# Patient Record
Sex: FEMALE | Race: OTHER | Employment: FULL TIME | URBAN - NONMETROPOLITAN AREA
[De-identification: names, ages, dates, MRNs, and addresses within clinical notes are randomized per-mention and may not be internally consistent; named-entity substitution may affect disease eponyms.]

---

## 2017-04-17 ENCOUNTER — OFFICE VISIT (OUTPATIENT)
Age: 32
End: 2017-04-17

## 2017-04-17 VITALS
OXYGEN SATURATION: 98 % | TEMPERATURE: 98.9 F | DIASTOLIC BLOOD PRESSURE: 74 MMHG | SYSTOLIC BLOOD PRESSURE: 120 MMHG | RESPIRATION RATE: 16 BRPM | WEIGHT: 203.3 LBS | HEART RATE: 78 BPM | BODY MASS INDEX: 33.87 KG/M2 | HEIGHT: 65 IN

## 2017-04-17 DIAGNOSIS — K80.20 CALCULUS OF GALLBLADDER WITHOUT CHOLECYSTITIS WITHOUT OBSTRUCTION: Primary | ICD-10-CM

## 2017-04-17 DIAGNOSIS — K42.9 UMBILICAL HERNIA WITHOUT OBSTRUCTION AND WITHOUT GANGRENE: ICD-10-CM

## 2017-04-17 PROCEDURE — 99203 OFFICE O/P NEW LOW 30 MIN: CPT | Performed by: SURGERY

## 2017-04-17 ASSESSMENT — ENCOUNTER SYMPTOMS
CONSTIPATION: 0
COUGH: 0
BACK PAIN: 0
NAUSEA: 1
SORE THROAT: 0
BLOOD IN STOOL: 0
SHORTNESS OF BREATH: 1
STRIDOR: 0
ABDOMINAL PAIN: 1
VOMITING: 1

## 2017-05-04 ENCOUNTER — TELEPHONE (OUTPATIENT)
Age: 32
End: 2017-05-04

## 2017-05-08 ENCOUNTER — TELEPHONE (OUTPATIENT)
Age: 32
End: 2017-05-08

## 2017-05-08 DIAGNOSIS — R11.2 NAUSEA AND VOMITING, INTRACTABILITY OF VOMITING NOT SPECIFIED, UNSPECIFIED VOMITING TYPE: Primary | ICD-10-CM

## 2017-05-08 RX ORDER — ONDANSETRON 4 MG/1
4 TABLET, FILM COATED ORAL DAILY PRN
Qty: 20 TABLET | Refills: 0 | Status: SHIPPED | OUTPATIENT
Start: 2017-05-08 | End: 2017-05-18

## 2017-05-18 ENCOUNTER — OFFICE VISIT (OUTPATIENT)
Age: 32
End: 2017-05-18

## 2017-05-18 VITALS
BODY MASS INDEX: 33.61 KG/M2 | RESPIRATION RATE: 18 BRPM | DIASTOLIC BLOOD PRESSURE: 72 MMHG | WEIGHT: 201.7 LBS | HEART RATE: 73 BPM | SYSTOLIC BLOOD PRESSURE: 124 MMHG | HEIGHT: 65 IN | TEMPERATURE: 99.2 F | OXYGEN SATURATION: 97 %

## 2017-05-18 DIAGNOSIS — Z98.890 POST-OPERATIVE STATE: ICD-10-CM

## 2017-05-18 DIAGNOSIS — K80.20 CALCULUS OF GALLBLADDER WITHOUT CHOLECYSTITIS WITHOUT OBSTRUCTION: Primary | ICD-10-CM

## 2017-05-18 PROCEDURE — 99024 POSTOP FOLLOW-UP VISIT: CPT | Performed by: SURGERY

## 2018-01-10 ENCOUNTER — HOSPITAL ENCOUNTER (EMERGENCY)
Age: 33
Discharge: HOME | End: 2018-01-10

## 2018-01-10 VITALS
DIASTOLIC BLOOD PRESSURE: 88 MMHG | BODY MASS INDEX: 34.38 KG/M2 | HEART RATE: 75 BPM | OXYGEN SATURATION: 98 % | WEIGHT: 206.6 LBS | RESPIRATION RATE: 20 BRPM | TEMPERATURE: 98.2 F | SYSTOLIC BLOOD PRESSURE: 144 MMHG

## 2018-01-10 DIAGNOSIS — J45.21 MILD INTERMITTENT ACUTE ASTHMATIC BRONCHITIS: Primary | ICD-10-CM

## 2018-01-10 PROCEDURE — 99214 OFFICE O/P EST MOD 30 MIN: CPT | Performed by: NURSE PRACTITIONER

## 2018-01-10 PROCEDURE — 99212 OFFICE O/P EST SF 10 MIN: CPT

## 2018-01-10 RX ORDER — BENZONATATE 200 MG/1
200 CAPSULE ORAL 3 TIMES DAILY PRN
Qty: 21 CAPSULE | Refills: 0 | Status: SHIPPED | OUTPATIENT
Start: 2018-01-10 | End: 2018-01-17

## 2018-01-10 RX ORDER — PREDNISONE 20 MG/1
20 TABLET ORAL 2 TIMES DAILY
Qty: 10 TABLET | Refills: 0 | Status: SHIPPED | OUTPATIENT
Start: 2018-01-10 | End: 2018-01-15

## 2018-01-10 RX ORDER — AZITHROMYCIN 250 MG/1
TABLET, FILM COATED ORAL
Qty: 6 TABLET | Refills: 0 | Status: SHIPPED | OUTPATIENT
Start: 2018-01-10 | End: 2018-02-28

## 2018-01-10 NOTE — ED PROVIDER NOTES
[id number]  Urgent Care Encounter      CHIEF COMPLAINT       Chief Complaint   Patient presents with    Cough     x 1 week     Sweats     x 1 week     Pharyngitis    Sinusitis       Nurses Notes reviewed and I agree except as noted in the HPI. HISTORY OF PRESENT ILLNESS   Donald Albert is a 28 y.o. female who presents:  Sore throat, nonproductive cough, post nasal drip, post tussive vomiting started yesterday, wheezing over last couple days. Has used over counter Dayquil, mucinex. History of asthma, diabetes. She currently works at the hospital.       The history is provided by the patient. REVIEW OF SYSTEMS     Review of Systems   Constitutional: Positive for chills and diaphoresis. Negative for activity change, appetite change, fatigue, fever and unexpected weight change. HENT: Positive for congestion, postnasal drip, rhinorrhea and sore throat. Negative for ear discharge, ear pain, mouth sores, sinus pressure, sneezing and voice change. Eyes: Negative for photophobia, discharge, redness, itching and visual disturbance. Respiratory: Negative for cough, chest tightness, shortness of breath and wheezing. Gastrointestinal: Positive for vomiting. Negative for abdominal pain, diarrhea and nausea. Musculoskeletal: Negative for myalgias. Skin: Negative for pallor and rash. Neurological: Negative for dizziness, light-headedness and headaches. Hematological: Negative for adenopathy. PAST MEDICAL HISTORY         Diagnosis Date    Asthma     Diabetes mellitus ([address]     Gestational    PONV (postoperative nausea and vomiting)     Prolonged emergence from general anesthesia     really sleepy    PVC's (premature ventricular contractions)     Tachycardia        SURGICAL HISTORY     Patient  has a past surgical history that includes Appendectomy; Tonsillectomy and adenoidectomy (d an); Dilation and curettage of uterus; and Cholecystectomy (05/03/201).     CURRENT MEDICATIONS       Discharge Medication List as of 1/10/2018  5:10 PM      CONTINUE these medications which have NOT CHANGED    Details   Homeopathic Products (OSCILLOCOCCINUM PO) Take by mouthHistorical Med      albuterol sulfate  (90 BASE) MCG/ACT inhaler Inhale 2 puffs into the lungs every 4 hours as needed for Wheezing or Shortness of Breath, Disp-1 Inhaler, R-0      acetaminophen (TYLENOL) 500 MG tablet Take 1,000 mg by mouth every 6 hours as needed for Pain             ALLERGIES     Patient is is allergic to reglan [metoclopramide hcl] and tetanus toxoids. FAMILY HISTORY     Patient's family history includes Asthma in her mother; Diabetes in her maternal grandmother and mother; Heart Disease in her maternal grandmother; High Blood Pressure in her mother. SOCIAL HISTORY     Patient  reports that she has never smoked. She has never used smokeless tobacco. She reports that she drinks alcohol. She reports that she does not use drugs. PHYSICAL EXAM     ED TRIAGE VITALS  BP: (!) 144/88, Temp: 98.2 °F (36.8 °C), Pulse: 75, Resp: 20, SpO2: 98 %  Physical Exam   Constitutional: She is oriented to person, place, and time. She appears well-developed and well-nourished. Non-toxic appearance. She does not have a sickly appearance. She does not appear ill. No distress. HENT:   Head: Normocephalic and atraumatic. Head is without right periorbital erythema and without left periorbital erythema. Right Ear: Hearing, tympanic membrane, external ear and ear canal normal.   Left Ear: Hearing, tympanic membrane, external ear and ear canal normal.   Nose: Nose normal. Right sinus exhibits no maxillary sinus tenderness and no frontal sinus tenderness. Left sinus exhibits no maxillary sinus tenderness and no frontal sinus tenderness. Mouth/Throat: Uvula is midline, oropharynx is clear and moist and mucous membranes are normal.   Neck: Normal range of motion. Neck supple.    Cardiovascular: Normal rate, regular

## 2018-01-11 ASSESSMENT — ENCOUNTER SYMPTOMS
PHOTOPHOBIA: 0
VOMITING: 1
DIARRHEA: 0
WHEEZING: 0
ABDOMINAL PAIN: 0
COUGH: 0
CHEST TIGHTNESS: 0
SHORTNESS OF BREATH: 0
SINUS PRESSURE: 0
EYE ITCHING: 0
VOICE CHANGE: 0
EYE REDNESS: 0
SORE THROAT: 1
RHINORRHEA: 1
NAUSEA: 0
EYE DISCHARGE: 0

## 2018-02-28 ENCOUNTER — HOSPITAL ENCOUNTER (EMERGENCY)
Dept: GENERAL RADIOLOGY | Age: 33
Discharge: HOME | End: 2018-02-28

## 2018-02-28 ENCOUNTER — HOSPITAL ENCOUNTER (EMERGENCY)
Age: 33
Discharge: HOME | End: 2018-02-28

## 2018-02-28 VITALS
SYSTOLIC BLOOD PRESSURE: 128 MMHG | BODY MASS INDEX: 34.66 KG/M2 | TEMPERATURE: 97.7 F | RESPIRATION RATE: 16 BRPM | HEART RATE: 79 BPM | DIASTOLIC BLOOD PRESSURE: 87 MMHG | HEIGHT: 65 IN | WEIGHT: 208 LBS | OXYGEN SATURATION: 98 %

## 2018-02-28 DIAGNOSIS — R07.89 CHEST WALL PAIN: ICD-10-CM

## 2018-02-28 DIAGNOSIS — R51.9 INTRACTABLE EPISODIC HEADACHE, UNSPECIFIED HEADACHE TYPE: ICD-10-CM

## 2018-02-28 DIAGNOSIS — H81.10 BENIGN PAROXYSMAL POSITIONAL VERTIGO, UNSPECIFIED LATERALITY: Primary | ICD-10-CM

## 2018-02-28 DIAGNOSIS — H65.93 MIDDLE EAR EFFUSION, BILATERAL: ICD-10-CM

## 2018-02-28 LAB
EKG ATRIAL RATE: 74 BPM
EKG P AXIS: 13 DEGREES
EKG P-R INTERVAL: 198 MS
EKG Q-T INTERVAL: 378 MS
EKG QRS DURATION: 80 MS
EKG QTC CALCULATION (BAZETT): 419 MS
EKG R AXIS: 73 DEGREES
EKG T AXIS: 76 DEGREES
EKG VENTRICULAR RATE: 74 BPM

## 2018-02-28 PROCEDURE — 71046 X-RAY EXAM CHEST 2 VIEWS: CPT

## 2018-02-28 PROCEDURE — 93010 ELECTROCARDIOGRAM REPORT: CPT | Performed by: NUCLEAR MEDICINE

## 2018-02-28 PROCEDURE — 99214 OFFICE O/P EST MOD 30 MIN: CPT | Performed by: NURSE PRACTITIONER

## 2018-02-28 PROCEDURE — 93005 ELECTROCARDIOGRAM TRACING: CPT | Performed by: NURSE PRACTITIONER

## 2018-02-28 PROCEDURE — 99215 OFFICE O/P EST HI 40 MIN: CPT

## 2018-02-28 RX ORDER — PREDNISONE 20 MG/1
20 TABLET ORAL 2 TIMES DAILY
Qty: 10 TABLET | Refills: 0 | Status: SHIPPED | OUTPATIENT
Start: 2018-02-28 | End: 2018-03-05

## 2018-02-28 ASSESSMENT — HEART SCORE
ECG: 0
ECG: 0

## 2018-02-28 ASSESSMENT — ENCOUNTER SYMPTOMS
BACK PAIN: 1
CHEST TIGHTNESS: 0
SHORTNESS OF BREATH: 0
RHINORRHEA: 0
TROUBLE SWALLOWING: 0
SINUS PAIN: 0
ORTHOPNEA: 0
DIARRHEA: 0
SINUS PRESSURE: 0
SORE THROAT: 0
ABDOMINAL PAIN: 0
PHOTOPHOBIA: 0
VOMITING: 0
NAUSEA: 0
COUGH: 0

## 2018-02-28 ASSESSMENT — PAIN SCALES - GENERAL: PAINLEVEL_OUTOF10: 5

## 2018-02-28 ASSESSMENT — PAIN DESCRIPTION - DESCRIPTORS: DESCRIPTORS: PRESSURE

## 2018-02-28 ASSESSMENT — PAIN DESCRIPTION - LOCATION: LOCATION: HEAD;CHEST

## 2018-02-28 NOTE — ED PROVIDER NOTES
Neurological: She is alert and oriented to person, place, and time. Skin: Skin is warm, dry and intact. No rash noted. She is not diaphoretic. Nursing note and vitals reviewed. DIAGNOSTIC RESULTS   Labs:No results found for this visit on 02/28/18. IMAGING:    XR CHEST STANDARD (2 VW)   Final Result   Normal chest radiographs. **This report has been created using voice recognition software. It may contain minor errors which are inherent in voice recognition technology. **      Final report electronically signed by Dr. Jannet Momin on 2/28/2018 9:14 AM            EKG:      URGENT CARE COURSE:     Vitals:    02/28/18 0817 02/28/18 0857   BP: 128/87    Pulse: 79    Resp: 16    Temp: 97.7 °F (36.5 °C)    TempSrc: Temporal    SpO2: 97% 98%   Weight: 208 lb (94.3 kg)    Height: 5' 5\" (1.651 m)        Medications - No data to display    ED Course        PROCEDURES:  None    FINAL IMPRESSION      1. Benign paroxysmal positional vertigo, unspecified laterality    2. Chest wall pain    3. Middle ear effusion, bilateral    4. Intractable episodic headache, unspecified headache type          DISPOSITION/PLAN   DISPOSITION Decision To Discharge 02/28/2018 09:23:11 AM  The patient was advised to go slow from lying sitting and standing the patient was also advised to apply heat to the chest she will also be given some prednisone to help relieve some of the medical where effusion as well as to the anterior chest discomfort. The patient was advised to go to emergency department if she has any changes such as shortness of breath, diaphoresis, nausea radiation of pain to the neck jaw or arm. She is to follow-up with her primary care provider for further evaluation. The patient is agreeable to the treatment plan states that she is going to work from here and left ambulatory without any problems. PATIENT REFERRED TO:  No follow-up provider specified.     DISCHARGE MEDICATIONS:  New Prescriptions

## 2018-07-22 ENCOUNTER — HOSPITAL ENCOUNTER (EMERGENCY)
Age: 33
Discharge: HOME | End: 2018-07-22
Payer: COMMERCIAL

## 2018-07-22 ENCOUNTER — APPOINTMENT (OUTPATIENT)
Dept: GENERAL RADIOLOGY | Age: 33
End: 2018-07-22
Payer: COMMERCIAL

## 2018-07-22 VITALS
BODY MASS INDEX: 33.49 KG/M2 | DIASTOLIC BLOOD PRESSURE: 99 MMHG | TEMPERATURE: 98.9 F | WEIGHT: 201 LBS | SYSTOLIC BLOOD PRESSURE: 152 MMHG | OXYGEN SATURATION: 98 % | HEIGHT: 65 IN | RESPIRATION RATE: 14 BRPM | HEART RATE: 83 BPM

## 2018-07-22 DIAGNOSIS — S46.912A STRAIN OF LEFT SHOULDER, INITIAL ENCOUNTER: Primary | ICD-10-CM

## 2018-07-22 PROCEDURE — 99283 EMERGENCY DEPT VISIT LOW MDM: CPT

## 2018-07-22 PROCEDURE — 73030 X-RAY EXAM OF SHOULDER: CPT

## 2018-07-22 PROCEDURE — A4565 SLINGS: HCPCS

## 2018-07-22 PROCEDURE — 73050 X-RAY EXAM OF SHOULDERS: CPT

## 2018-07-22 ASSESSMENT — ENCOUNTER SYMPTOMS
VOMITING: 0
NAUSEA: 0
COUGH: 0
COLOR CHANGE: 0
WHEEZING: 0
SHORTNESS OF BREATH: 0

## 2018-07-22 ASSESSMENT — PAIN SCALES - GENERAL: PAINLEVEL_OUTOF10: 8

## 2018-07-22 ASSESSMENT — PAIN DESCRIPTION - DESCRIPTORS: DESCRIPTORS: ACHING

## 2018-07-22 ASSESSMENT — PAIN DESCRIPTION - LOCATION: LOCATION: SHOULDER

## 2018-07-22 ASSESSMENT — PAIN DESCRIPTION - ORIENTATION: ORIENTATION: LEFT

## 2018-07-22 ASSESSMENT — PAIN DESCRIPTION - PAIN TYPE: TYPE: ACUTE PAIN

## 2018-07-22 NOTE — ED TRIAGE NOTES
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 Pt states it worsens with movement.

## 2018-07-22 NOTE — ED PROVIDER NOTES
emergence from general anesthesia; PVC's (premature ventricular contractions); and Tachycardia. SURGICAL HISTORY      has a past surgical history that includes Appendectomy; Tonsillectomy and adenoidectomy (d an); Dilation and curettage of uterus; and Cholecystectomy (05/03/201). CURRENT MEDICATIONS       Previous Medications    ACETAMINOPHEN (TYLENOL) 500 MG TABLET    Take 1,000 mg by mouth every 6 hours as needed for Pain    ALBUTEROL SULFATE  (90 BASE) MCG/ACT INHALER    Inhale 2 puffs into the lungs every 4 hours as needed for Wheezing or Shortness of Breath    MECLIZINE HCL PO    Take by mouth       ALLERGIES     is allergic to reglan [metoclopramide hcl] and tetanus toxoids. FAMILY HISTORY     indicated that her mother is alive. She indicated that her father is alive. She indicated that the status of her maternal grandmother is unknown.    family history includes Asthma in her mother; Diabetes in her maternal grandmother and mother; Heart Disease in her maternal grandmother; High Blood Pressure in her mother. SOCIAL HISTORY      reports that she has never smoked. She has never used smokeless tobacco. She reports that she drinks alcohol. She reports that she does not use drugs. PHYSICAL EXAM     INITIAL VITALS:  height is 5' 5\" (1.651 m) and weight is 201 lb (91.2 kg). Her oral temperature is 98.9 °F (37.2 °C). Her blood pressure is 152/99 (abnormal) and her pulse is 83. Her respiration is 14 and oxygen saturation is 98%. Physical Exam   Constitutional: She is oriented to person, place, and time. She appears well-developed and well-nourished. No distress. HENT:   Head: Normocephalic and atraumatic. Right Ear: External ear normal.   Left Ear: External ear normal.   Eyes: Conjunctivae and EOM are normal. Pupils are equal, round, and reactive to light. Neck: Normal range of motion. Neck supple.    Cardiovascular: Normal rate, regular rhythm, S1 normal, S2 normal, normal heart sounds

## 2019-06-12 ENCOUNTER — HOSPITAL ENCOUNTER (EMERGENCY)
Age: 34
Discharge: HOME | End: 2019-06-12
Payer: MEDICARE

## 2019-06-12 VITALS
WEIGHT: 192.6 LBS | BODY MASS INDEX: 32.09 KG/M2 | TEMPERATURE: 98.4 F | RESPIRATION RATE: 16 BRPM | DIASTOLIC BLOOD PRESSURE: 78 MMHG | HEART RATE: 78 BPM | OXYGEN SATURATION: 97 % | HEIGHT: 65 IN | SYSTOLIC BLOOD PRESSURE: 111 MMHG
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
## 2019-06-12 DIAGNOSIS — M79.632 LEFT FOREARM PAIN: Primary | ICD-10-CM

## 2019-06-12 PROCEDURE — 2709999900 HC NON-CHARGEABLE SUPPLY

## 2019-06-12 PROCEDURE — 99213 OFFICE O/P EST LOW 20 MIN: CPT

## 2019-06-12 PROCEDURE — L3809 WHFO W/O JOINTS PRE OTS: HCPCS

## 2019-06-12 PROCEDURE — 6360000002 HC RX W HCPCS: Performed by: NURSE PRACTITIONER

## 2019-06-12 PROCEDURE — 99213 OFFICE O/P EST LOW 20 MIN: CPT | Performed by: NURSE PRACTITIONER

## 2019-06-12 PROCEDURE — 96372 THER/PROPH/DIAG INJ SC/IM: CPT

## 2019-06-12 RX ORDER — PREDNISONE 20 MG/1
20 TABLET ORAL 2 TIMES DAILY
Qty: 14 TABLET | Refills: 0 | Status: SHIPPED | OUTPATIENT
Start: 2019-06-12 | End: 2019-06-19

## 2019-06-12 RX ORDER — KETOROLAC TROMETHAMINE 30 MG/ML
30 INJECTION, SOLUTION INTRAMUSCULAR; INTRAVENOUS ONCE
Status: COMPLETED | OUTPATIENT
Start: 2019-06-12 | End: 2019-06-12

## 2019-06-12 RX ORDER — METHYLPREDNISOLONE ACETATE 40 MG/ML
40 INJECTION, SUSPENSION INTRA-ARTICULAR; INTRALESIONAL; INTRAMUSCULAR; SOFT TISSUE ONCE
Status: COMPLETED | OUTPATIENT
Start: 2019-06-12 | End: 2019-06-12

## 2019-06-12 RX ORDER — METHYLPREDNISOLONE ACETATE 80 MG/ML
80 INJECTION, SUSPENSION INTRA-ARTICULAR; INTRALESIONAL; INTRAMUSCULAR; SOFT TISSUE ONCE
Status: COMPLETED | OUTPATIENT
Start: 2019-06-12 | End: 2019-06-12

## 2019-06-12 RX ADMIN — KETOROLAC TROMETHAMINE 30 MG: 30 INJECTION, SOLUTION INTRAMUSCULAR at 19:06

## 2019-06-12 RX ADMIN — METHYLPREDNISOLONE ACETATE 80 MG: 80 INJECTION, SUSPENSION INTRA-ARTICULAR; INTRALESIONAL; INTRAMUSCULAR; SOFT TISSUE at 19:06

## 2019-06-12 RX ADMIN — METHYLPREDNISOLONE ACETATE 40 MG: 40 INJECTION, SUSPENSION INTRA-ARTICULAR; INTRALESIONAL; INTRAMUSCULAR; SOFT TISSUE at 19:06

## 2019-06-12 ASSESSMENT — PAIN DESCRIPTION - DESCRIPTORS: DESCRIPTORS: ACHING

## 2019-06-12 ASSESSMENT — PAIN DESCRIPTION - ORIENTATION: ORIENTATION: LEFT;LOWER

## 2019-06-12 ASSESSMENT — PAIN SCALES - GENERAL
PAINLEVEL_OUTOF10: 7
PAINLEVEL_OUTOF10: 8
PAINLEVEL_OUTOF10: 10

## 2019-06-12 ASSESSMENT — PAIN - FUNCTIONAL ASSESSMENT: PAIN_FUNCTIONAL_ASSESSMENT: PREVENTS OR INTERFERES SOME ACTIVE ACTIVITIES AND ADLS

## 2019-06-12 ASSESSMENT — PAIN DESCRIPTION - PAIN TYPE: TYPE: ACUTE PAIN

## 2019-06-12 ASSESSMENT — PAIN DESCRIPTION - LOCATION: LOCATION: ARM

## 2019-06-12 NOTE — ED PROVIDER NOTES
[facility]  Urgent Care Encounter      [address] [facility]       Chief Complaint   Patient presents with    Arm Pain     left       Nurses Notes reviewed and I agree except as noted in the HPI. HISTORY OF PRESENT ILLNESS   Thom Christine is a 35 y.o. 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 The history is provided by the patient. Arm Injury   Location:  Arm  Arm location:  L forearm  Injury: no    Pain details:     Quality:  Sharp    Radiates to:  Does not radiate    Severity:  Severe (10/10)    Onset quality:  Sudden    Duration: 30 minutes ago she was at home leaning down to pet her animal at home and it felt like it popped and now has a sharp pain 10/10. Timing:  Constant    Progression:  Unchanged  Handedness:  Right-handed  Dislocation: no    Prior injury to area:  No  Relieved by:  None tried  Worsened by: Movement  Ineffective treatments:  None tried  Associated symptoms: no back pain, no decreased range of motion, no fatigue, no fever, no muscle weakness, no numbness, no swelling and no tingling        REVIEW OF SYSTEMS     Review of Systems   Constitutional: Positive for activity change. Negative for appetite change, chills, diaphoresis, fatigue and fever. Respiratory: Negative for shortness of breath. Cardiovascular: Negative for chest pain. Musculoskeletal: Positive for arthralgias (left forearm pain). Negative for back pain and joint swelling. Skin: Negative for color change, pallor, rash and wound. Neurological: Negative for weakness and numbness. Hematological: Does not bruise/bleed easily. Psychiatric/Behavioral: The patient is not nervous/anxious.         PAST MEDICAL HISTORY         Diagnosis Date    Asthma     Diabetes mellitus ([facility] [address]     Gestational    PONV (postoperative nausea and vomiting)     Prolonged emergence from general anesthesia     really sleepy    PVC's (premature ventricular contractions)     Tachycardia        SURGICAL HISTORY     Patient  has a past surgical history that includes Appendectomy; Tonsillectomy and adenoidectomy (d an); Dilation and curettage of uterus; and Cholecystectomy (05/03/201). CURRENT MEDICATIONS       Discharge Medication List as of 6/12/2019  7:30 PM      CONTINUE these medications which have NOT CHANGED    Details   MECLIZINE HCL PO Take by mouthHistorical Med      albuterol sulfate  (90 BASE) MCG/ACT inhaler Inhale 2 puffs into the lungs every 4 hours as needed for Wheezing or Shortness of Breath, Disp-1 Inhaler, R-0      acetaminophen (TYLENOL) 500 MG tablet Take 1,000 mg by mouth every 6 hours as needed for Pain             ALLERGIES     Patient is is allergic to reglan [metoclopramide hcl] and tetanus toxoids. FAMILY HISTORY     Patient's family history includes Asthma in her mother; Diabetes in her maternal grandmother and mother; Heart Disease in her maternal grandmother; High Blood Pressure in her mother. SOCIAL HISTORY     Patient  reports that she has never smoked. She has never used smokeless tobacco. She reports that she drinks alcohol. She reports that she does not use drugs. PHYSICAL EXAM     ED TRIAGE VITALS  BP: 111/78, Temp: 98.4 °F (36.9 °C), Pulse: 78, Resp: 16, SpO2: 97 %  Physical Exam   Constitutional: She is oriented to person, place, and time. Vital signs are normal. She appears well-developed and well-nourished. Non-toxic appearance. She does not have a sickly appearance. She does not appear ill. No distress. HENT:   Head: Normocephalic and atraumatic. Eyes: Right conjunctiva is not injected. Left conjunctiva is not injected. Pupils are equal.   Neck: Normal range of motion. Cardiovascular: Normal rate, regular rhythm, normal heart sounds and intact distal pulses.    No murmur 1. Left forearm pain    2. Overuse syndrome        DISPOSITION/PLAN   DISPOSITION Decision To Discharge 06/12/2019 07:16:54 PM  No injury   Has repetitive , overs use at work of that arm/hand  Onset 30 minutes ago, likely flared up when leaning down and petting her dog at home  velcro wrist splint applied  Toradol and Depo Medrol injection improved her pain during urgent care down from a 10 to a 7. No signs of infection or trauma  No neuro compromise noted  Moist heat ,alternate with ice  Take medication as directed      PATIENT REFERRED TO:  Aftab Officer, BLANQUITA Badillo CNP  [facility]    Schedule an appointment as soon as possible for a visit in 1 week      Jennifer Green   [address] [facility] [phone]  Schedule an appointment as soon as possible for a visit   If no improvement of symptoms    Patient instructed to follow up with PCP. If symptoms worsen, become severe or new symptoms develop patient instructedto go to the emergency room immediately. DISCHARGE MEDICATIONS:  Discharge Medication List as of 6/12/2019  7:30 PM      START taking these medications    Details   predniSONE (DELTASONE) 20 MG tablet Take 1 tablet by mouth 2 times daily for 7 days, Disp-14 tablet, R-0Normal           Discharge Medication List as of 6/12/2019  7:30 PM          Patient giveneducational materials - see patient instructions. Discussed use, benefit, and side effects of prescribed medications. All patient questions answered. Pt voiced understanding. Reviewed health maintenance. Patient agreedwith treatment plan. Follow up as directed.      Rosabel Schilder, APRN - CNP Rosabel Schilder, APRN - CNP  06/17/19 1007

## 2019-06-12 NOTE — ED TRIAGE NOTES
Patient ambulated to room with complaint of left forearm pain. States she was petting her dog and felt a sharp pain thru her forearm about 30 min ago.

## 2019-06-17 ASSESSMENT — ENCOUNTER SYMPTOMS
COLOR CHANGE: 0
BACK PAIN: 0
SHORTNESS OF BREATH: 0

## 2019-07-25 ENCOUNTER — HOSPITAL ENCOUNTER (EMERGENCY)
Age: 34
Discharge: HOME | End: 2019-07-25
Payer: MEDICARE

## 2019-07-25 VITALS
DIASTOLIC BLOOD PRESSURE: 79 MMHG | TEMPERATURE: 98.3 F | BODY MASS INDEX: 31.99 KG/M2 | HEIGHT: 65 IN | RESPIRATION RATE: 18 BRPM | OXYGEN SATURATION: 98 % | WEIGHT: 192 LBS | SYSTOLIC BLOOD PRESSURE: 116 MMHG | HEART RATE: 72 BPM

## 2019-07-25 DIAGNOSIS — G44.209 TENSION HEADACHE: Primary | ICD-10-CM

## 2019-07-25 PROCEDURE — 6370000000 HC RX 637 (ALT 250 FOR IP): Performed by: PHYSICIAN ASSISTANT

## 2019-07-25 PROCEDURE — 96374 THER/PROPH/DIAG INJ IV PUSH: CPT

## 2019-07-25 PROCEDURE — 96372 THER/PROPH/DIAG INJ SC/IM: CPT

## 2019-07-25 PROCEDURE — 6360000002 HC RX W HCPCS: Performed by: PHYSICIAN ASSISTANT

## 2019-07-25 PROCEDURE — 99283 EMERGENCY DEPT VISIT LOW MDM: CPT

## 2019-07-25 RX ORDER — ONDANSETRON 4 MG/1
4 TABLET, ORALLY DISINTEGRATING ORAL ONCE
Status: COMPLETED | OUTPATIENT
Start: 2019-07-25 | End: 2019-07-25

## 2019-07-25 RX ORDER — NAPROXEN 500 MG/1
500 TABLET ORAL 2 TIMES DAILY
Qty: 20 TABLET | Refills: 0 | Status: SHIPPED | OUTPATIENT
Start: 2019-07-25

## 2019-07-25 RX ORDER — KETOROLAC TROMETHAMINE 30 MG/ML
30 INJECTION, SOLUTION INTRAMUSCULAR; INTRAVENOUS ONCE
Status: COMPLETED | OUTPATIENT
Start: 2019-07-25 | End: 2019-07-25

## 2019-07-25 RX ORDER — DIPHENHYDRAMINE HYDROCHLORIDE 50 MG/ML
50 INJECTION INTRAMUSCULAR; INTRAVENOUS ONCE
Status: COMPLETED | OUTPATIENT
Start: 2019-07-25 | End: 2019-07-25

## 2019-07-25 RX ORDER — ONDANSETRON 4 MG/1
4 TABLET, FILM COATED ORAL EVERY 8 HOURS PRN
Qty: 20 TABLET | Refills: 0 | Status: SHIPPED | OUTPATIENT
Start: 2019-07-25 | End: 2019-08-14

## 2019-07-25 RX ADMIN — ONDANSETRON 4 MG: 4 TABLET, ORALLY DISINTEGRATING ORAL at 22:14

## 2019-07-25 RX ADMIN — DIPHENHYDRAMINE HYDROCHLORIDE 50 MG: 50 INJECTION, SOLUTION INTRAMUSCULAR; INTRAVENOUS at 22:19

## 2019-07-25 RX ADMIN — KETOROLAC TROMETHAMINE 30 MG: 30 INJECTION, SOLUTION INTRAMUSCULAR at 22:14

## 2019-07-25 ASSESSMENT — PAIN DESCRIPTION - DESCRIPTORS
DESCRIPTORS: HEADACHE
DESCRIPTORS: HEADACHE

## 2019-07-25 ASSESSMENT — ENCOUNTER SYMPTOMS
EYE PAIN: 0
SHORTNESS OF BREATH: 0
BACK PAIN: 0
ABDOMINAL PAIN: 0
COUGH: 0
WHEEZING: 0
DIARRHEA: 0
VOMITING: 1
NAUSEA: 1
SORE THROAT: 0
RHINORRHEA: 0
EYE DISCHARGE: 0

## 2019-07-25 ASSESSMENT — PAIN DESCRIPTION - PAIN TYPE
TYPE: ACUTE PAIN

## 2019-07-25 ASSESSMENT — PAIN DESCRIPTION - LOCATION
LOCATION: HEAD

## 2019-07-25 ASSESSMENT — PAIN SCALES - GENERAL
PAINLEVEL_OUTOF10: 7
PAINLEVEL_OUTOF10: 5
PAINLEVEL_OUTOF10: 7

## 2019-07-25 ASSESSMENT — PAIN DESCRIPTION - FREQUENCY: FREQUENCY: CONTINUOUS

## 2019-07-25 ASSESSMENT — PAIN DESCRIPTION - PROGRESSION: CLINICAL_PROGRESSION: GRADUALLY IMPROVING

## 2019-07-26 NOTE — ED PROVIDER NOTES
[facility]  eMERGENCY dEPARTMENT eNCOUnter          CHIEF COMPLAINT       Chief Complaint   Patient presents with    Headache       Nurses Notes reviewed and I agree except as noted in the HPI. HISTORY OF PRESENT ILLNESS    Latesha Paige is a 35 y.o. female who presents to the Emergency Department for the evaluation of a headache. The headache began two hours ago as the patient was running her kids around on her day off. The patient states the headache is located around her whole head and she rates the pain as a 7/10 in severity. Associated symptoms include nausea and vomiting related to the pain. The patient took Tylenol for the pain but notes no relief. She didn't want to take any other pain medication because she is currently trying to get pregnant. The patient's past medical history includes asthma, DM, PVC's, and tachycardia. There are no further symptoms or concerns at this time. The HPI was provided by the patient. REVIEW OF SYSTEMS     Review of Systems   Constitutional: Negative for appetite change, chills, fatigue and fever. HENT: Negative for congestion, ear pain, rhinorrhea and sore throat. Eyes: Negative for pain, discharge and visual disturbance. Respiratory: Negative for cough, shortness of breath and wheezing. Cardiovascular: Negative for chest pain, palpitations and leg swelling. Gastrointestinal: Positive for nausea and vomiting (From pain). Negative for abdominal pain and diarrhea. Genitourinary: Negative for difficulty urinating, dysuria, hematuria and vaginal discharge. Musculoskeletal: Negative for arthralgias, back pain, joint swelling and neck pain. Skin: Negative for pallor and rash. Neurological: Positive for headaches (Whole head). Negative for dizziness, syncope, weakness, light-headedness and numbness. Hematological: Negative for adenopathy. Psychiatric/Behavioral: Negative for confusion and suicidal ideas.  The patient is not today for the evaluation of headache. The patient arrived in no acute distress and in stable condition. Within the department, I observed the patient's vital signs to be within acceptable range. On exam, I appreciated tenderness upon palpation of the occipital region and temples. Within the department, the patient was treated with Toradol, Benadryl, and Zofran-ODT for pain and nausea. I observed the patient's condition to improve during the duration of her stay. I explained my proposed course of treatment to her, who was amenable to my decision, and I answered all questions that were asked. She was discharged home in stable condition with prescriptions for Naproxen and Zofran, and the patient will return to the ED if her symptoms become more severe in nature or otherwise change. I advised the patient to follow-up with Faith Mercado CNP (family medicine) in 3 days. I also discussed return to ED precautions with her who verbalized understanding. CRITICAL CARE:   None     CONSULTS:  None    PROCEDURES:  None    FINAL IMPRESSION      1. Tension headache          DISPOSITION/PLAN   Discharge    PATIENT REFERRED TO:  BLANQUITA Emanuel CNP  [address]    Schedule an appointment as soon as possible for a visit in 3 days  As needed      DISCHARGE MEDICATIONS:  New Prescriptions    NAPROXEN (NAPROSYN) 500 MG TABLET    Take 1 tablet by mouth 2 times daily    ONDANSETRON (ZOFRAN) 4 MG TABLET    Take 1 tablet by mouth every 8 hours as needed for Nausea       (Please note that portions of this note were completed with a voice recognition program.  Efforts were made to edit the dictations but occasionally words are mis-transcribed.)    The patient was given an opportunity to see the Emergency Attending. The patient voiced understanding that I was a Mid-LevelProvider and was in agreement with being seen independently by myself.      Scribe:  Viki Bentley 7/25/19 10:12 PM Scribing for

## 2019-07-26 NOTE — ED NOTES
Pt reports pain has decreased from a 7/10 to a 5/10. Pt is resting in cot. VSS. Lights dimmed for comfort.       Tressie Peabody, RN  07/25/19 [id number]

## 2020-03-02 ENCOUNTER — HOSPITAL ENCOUNTER (OUTPATIENT)
Age: 35
Setting detail: SPECIMEN
Discharge: HOME | End: 2020-03-02
Payer: COMMERCIAL

## 2020-03-02 LAB
GLUCOSE ADMINISTRATION: ABNORMAL
GLUCOSE TOLERANCE SCREEN 50G: 137 MG/DL (ref 70–135)

## 2020-03-16 ENCOUNTER — HOSPITAL ENCOUNTER (OUTPATIENT)
Age: 35
Setting detail: SPECIMEN
Discharge: HOME | End: 2020-03-16
Payer: COMMERCIAL

## 2020-03-16 LAB
-: ABNORMAL
AMORPHOUS: ABNORMAL
BACTERIA: ABNORMAL
BILIRUBIN URINE: NEGATIVE
CASTS UA: ABNORMAL /LPF (ref 0–8)
COLOR: YELLOW
COMMENT UA: ABNORMAL
CRYSTALS, UA: ABNORMAL /HPF
EPITHELIAL CELLS UA: ABNORMAL /HPF (ref 0–5)
GLUCOSE URINE: ABNORMAL
KETONES, URINE: NEGATIVE
LEUKOCYTE ESTERASE, URINE: NEGATIVE
MUCUS: ABNORMAL
NITRITE, URINE: POSITIVE
OTHER OBSERVATIONS UA: ABNORMAL
PH UA: 5 (ref 5–8)
PROTEIN UA: NEGATIVE
RBC UA: ABNORMAL /HPF (ref 0–4)
RENAL EPITHELIAL, UA: ABNORMAL /HPF
SPECIFIC GRAVITY UA: 1.03 (ref 1–1.03)
TRICHOMONAS: ABNORMAL
TURBIDITY: ABNORMAL
URINE HGB: ABNORMAL
UROBILINOGEN, URINE: NORMAL
WBC UA: ABNORMAL /HPF (ref 0–5)
YEAST: ABNORMAL

## 2020-03-17 LAB
CULTURE: NORMAL
Lab: NORMAL
SPECIMEN DESCRIPTION: NORMAL

## 2020-04-25 ENCOUNTER — OFFICE VISIT (OUTPATIENT)
Dept: PRIMARY CARE CLINIC | Age: 35
End: 2020-04-25
Payer: COMMERCIAL

## 2020-04-25 VITALS
HEART RATE: 97 BPM | OXYGEN SATURATION: 98 % | DIASTOLIC BLOOD PRESSURE: 80 MMHG | SYSTOLIC BLOOD PRESSURE: 127 MMHG | TEMPERATURE: 98.4 F | RESPIRATION RATE: 16 BRPM

## 2020-04-25 LAB — STREPTOCOCCUS A RNA: NORMAL

## 2020-04-25 PROCEDURE — 87651 STREP A DNA AMP PROBE: CPT | Performed by: NURSE PRACTITIONER

## 2020-04-25 PROCEDURE — 99202 OFFICE O/P NEW SF 15 MIN: CPT | Performed by: NURSE PRACTITIONER

## 2020-04-25 PROCEDURE — G8428 CUR MEDS NOT DOCUMENT: HCPCS | Performed by: NURSE PRACTITIONER

## 2020-04-25 PROCEDURE — 1036F TOBACCO NON-USER: CPT | Performed by: NURSE PRACTITIONER

## 2020-04-25 PROCEDURE — G8417 CALC BMI ABV UP PARAM F/U: HCPCS | Performed by: NURSE PRACTITIONER

## 2020-04-25 ASSESSMENT — ENCOUNTER SYMPTOMS
SHORTNESS OF BREATH: 0
WHEEZING: 0
TROUBLE SWALLOWING: 0
SORE THROAT: 1
ABDOMINAL PAIN: 0
VOMITING: 1
DIARRHEA: 0
EYE REDNESS: 0
CHEST TIGHTNESS: 0
NAUSEA: 1
EYE DISCHARGE: 0
COUGH: 1
RHINORRHEA: 0

## 2020-04-25 NOTE — LETTER
[address]  [address]  Phone: [phone]  Fax: [phone]    Bhavik Hart, BLANQUITA - BAILEY        April 25, 2020     Patient: Kindra Abraham   YOB: 1985   Date of Visit: 4/25/2020       To Whom it May Concern:    Marya Reynaga was seen in my clinic on 4/25/2020. She may return to work on 5/7/2020 due to suspected COVID infection. If you have any questions or concerns, please don't hesitate to call.     Sincerely,         Bhavik Hart, APRN - CNP

## 2020-04-25 NOTE — PATIENT INSTRUCTIONS
school, work, or public areas. And don't use public transportation. · If you are sick:  ? Leave your home only if you need to get medical care. But call the doctor's office first so they know you're coming. And wear a face mask, if you have one.  ? If you have a face mask, wear it whenever you're around other people. It can help stop the spread of the virus when you cough or sneeze. ? Clean and disinfect your home every day. Use household  and disinfectant wipes or sprays. Take special care to clean things that you grab with your hands. These include doorknobs, remote controls, phones, and handles on your refrigerator and microwave. And don't forget countertops, tabletops, bathrooms, and computer keyboards. When to call for help  Call 911 anytime you think you may need emergency care. For example, call if:  · You have severe trouble breathing. (You can't talk at all.)  · You have constant chest pain or pressure. · You are severely dizzy or lightheaded. · You are confused or can't think clearly. · Your face and lips have a blue color. · You pass out (lose consciousness) or are very hard to wake up. Call your doctor now if you develop symptoms such as:  · Shortness of breath. · Fever. · Cough. If you need to get care, call ahead to the doctor's office for instructions before you go. Make sure you wear a face mask, if you have one, to prevent exposing other people to the virus. Where can you get the latest information? The following health organizations are tracking and studying this virus. Their websites contain the most up-to-date information. [place] also learn what to do if you think you may have been exposed to the virus. · U.S. Centers for Disease Control and Prevention (CDC): The CDC provides updated news about the disease and travel advice. The website also tells you how to prevent the spread of infection.  www.cdc.gov  · World Health Organization [facility]): WHO offers information about the
oral

## 2020-04-26 ENCOUNTER — CARE COORDINATION (OUTPATIENT)
Dept: OTHER | Facility: CLINIC | Age: 35
End: 2020-04-26

## 2020-04-28 ENCOUNTER — CARE COORDINATION (OUTPATIENT)
Dept: CARE COORDINATION | Age: 35
End: 2020-04-28

## 2020-04-28 LAB
PERFORMING LAB: NORMAL
REPORT: NORMAL
SARS-COV-2: NOT DETECTED

## 2020-04-28 NOTE — CARE COORDINATION
Comment alert noted in remote COVID-19 Loop Symptom Monitoring Program. Messaged patient to notify her test results are not back at this time. Day 3 - April 28    Brittney Renner RN, 9:53 AM  [facility], Your results are still not in. It still says it's being processed. Feel free to check back in. Thanks, Brittney Renner, Associate Care Manager  In reply to  \"I was just checking in to see if my results have came back yet. I wasnt sure how I would be notified of the results. \"

## 2020-05-20 ENCOUNTER — HOSPITAL ENCOUNTER (OUTPATIENT)
Age: 35
Setting detail: SPECIMEN
Discharge: HOME | End: 2020-05-20
Payer: COMMERCIAL

## 2020-05-20 LAB
3 HR GLUCOSE: 74 MG/DL (ref 65–139)
AMOUNT GLUCOSE GIVEN: 100 G
GLUCOSE FASTING: 83 MG/DL (ref 65–94)
GLUCOSE TOLERANCE TEST 1 HOUR: 144 MG/DL (ref 65–179)
GLUCOSE TOLERANCE TEST 2 HOUR: 92 MG/DL (ref 65–154)
T3 FREE: 2.93 PG/ML (ref 2.02–4.43)
THYROXINE, FREE: 0.94 NG/DL (ref 0.93–1.7)
TSH SERPL DL<=0.05 MIU/L-ACNC: 1.27 MIU/L (ref 0.3–5)

## 2020-05-21 LAB
ESTIMATED AVERAGE GLUCOSE: 103 MG/DL
HBA1C MFR BLD: 5.2 % (ref 4–6)

## 2020-06-05 ENCOUNTER — HOSPITAL ENCOUNTER (OUTPATIENT)
Age: 35
Setting detail: SPECIMEN
Discharge: HOME | End: 2020-06-05
Payer: COMMERCIAL

## 2020-06-06 LAB
ALBUMIN SERPL-MCNC: 3.5 G/DL (ref 3.5–5.2)
ALBUMIN/GLOBULIN RATIO: 1.3 (ref 1–2.5)
ALP BLD-CCNC: 135 U/L (ref 35–104)
ALT SERPL-CCNC: 9 U/L (ref 5–33)
AST SERPL-CCNC: 11 U/L
BILIRUB SERPL-MCNC: <0.1 MG/DL (ref 0.3–1.2)
BILIRUBIN DIRECT: <0.08 MG/DL
BILIRUBIN, INDIRECT: ABNORMAL MG/DL (ref 0–1)
GLOBULIN: ABNORMAL G/DL (ref 1.5–3.8)
TOTAL PROTEIN: 6.2 G/DL (ref 6.4–8.3)

## 2020-06-08 LAB — BILE ACIDS TOTAL: 3 UMOL/L (ref 0–10)

## 2020-06-29 ENCOUNTER — HOSPITAL ENCOUNTER (OUTPATIENT)
Age: 35
Setting detail: SPECIMEN
Discharge: HOME | End: 2020-06-29
Payer: COMMERCIAL

## 2020-06-30 LAB
CREATININE URINE: 164.6 MG/DL (ref 28–217)
TOTAL PROTEIN, URINE: 40 MG/DL
URINE TOTAL PROTEIN CREATININE RATIO: 0.24 (ref 0–0.2)

## 2020-11-22 ENCOUNTER — HOSPITAL ENCOUNTER (EMERGENCY)
Age: 35
Discharge: HOME | End: 2020-11-22
Payer: COMMERCIAL

## 2020-11-22 VITALS
HEART RATE: 77 BPM | DIASTOLIC BLOOD PRESSURE: 86 MMHG | OXYGEN SATURATION: 97 % | HEIGHT: 65 IN | TEMPERATURE: 97.6 F | SYSTOLIC BLOOD PRESSURE: 137 MMHG | BODY MASS INDEX: 34.99 KG/M2 | WEIGHT: 210 LBS | RESPIRATION RATE: 16 BRPM

## 2020-11-22 PROCEDURE — 99214 OFFICE O/P EST MOD 30 MIN: CPT | Performed by: NURSE PRACTITIONER

## 2020-11-22 PROCEDURE — 99212 OFFICE O/P EST SF 10 MIN: CPT

## 2020-11-22 RX ORDER — CIPROFLOXACIN 500 MG/1
500 TABLET, FILM COATED ORAL 2 TIMES DAILY
Qty: 20 TABLET | Refills: 0 | Status: SHIPPED | OUTPATIENT
Start: 2020-11-22 | End: 2020-12-02

## 2020-11-22 RX ORDER — BENZONATATE 200 MG/1
200 CAPSULE ORAL 3 TIMES DAILY PRN
Qty: 30 CAPSULE | Refills: 0 | Status: SHIPPED | OUTPATIENT
Start: 2020-11-22

## 2020-11-22 RX ORDER — PREDNISONE 20 MG/1
20 TABLET ORAL 2 TIMES DAILY
Qty: 10 TABLET | Refills: 0 | Status: SHIPPED | OUTPATIENT
Start: 2020-11-22 | End: 2020-11-27

## 2020-11-22 ASSESSMENT — ENCOUNTER SYMPTOMS
HOARSE VOICE: 1
SNORING: 0
CHEST TIGHTNESS: 0
ALLERGIC/IMMUNOLOGIC NEGATIVE: 1
SORE THROAT: 1
COUGH: 1
SHORTNESS OF BREATH: 0
EYES NEGATIVE: 1
WHEEZING: 0
RHINORRHEA: 0
NAUSEA: 1
SWOLLEN GLANDS: 1
SINUS PAIN: 1
SINUS PRESSURE: 1

## 2020-11-22 NOTE — ED PROVIDER NOTES
and wheezing. Cardiovascular: Negative for chest pain and leg swelling. Gastrointestinal: Positive for nausea. Endocrine: Negative for cold intolerance and heat intolerance. Genitourinary: Negative. Musculoskeletal: Positive for myalgias. Negative for arthralgias. Skin: Negative. Allergic/Immunologic: Negative. Neurological: Positive for headaches. Negative for vertigo. Hematological: Negative for adenopathy. Does not bruise/bleed easily. Psychiatric/Behavioral: Negative. PAST MEDICAL HISTORY         Diagnosis Date    Asthma     Diabetes mellitus (HCC)     Gestational    IBS (irritable bowel syndrome)     PONV (postoperative nausea and vomiting)     Prolonged emergence from general anesthesia     really sleepy    PVC's (premature ventricular contractions)     Tachycardia        SURGICAL HISTORY     Patient  has a past surgical history that includes Appendectomy; Tonsillectomy and adenoidectomy (d an); Dilation and curettage of uterus; Cholecystectomy (); and  section (2020). CURRENT MEDICATIONS       Discharge Medication List as of 2020  4:36 PM      CONTINUE these medications which have NOT CHANGED    Details   DICYCLOMINE HCL PO Take by mouthHistorical Med      Fluticasone Propionate (FLONASE NA) by Nasal routeHistorical Med      Pseudoephedrine HCl (SUDAFED PO) Take by mouthHistorical Med      naproxen (NAPROSYN) 500 MG tablet Take 1 tablet by mouth 2 times daily, Disp-20 tablet, R-0Print      albuterol sulfate  (90 BASE) MCG/ACT inhaler Inhale 2 puffs into the lungs every 4 hours as needed for Wheezing or Shortness of Breath, Disp-1 Inhaler, R-0      acetaminophen (TYLENOL) 500 MG tablet Take 1,000 mg by mouth every 6 hours as needed for Pain             ALLERGIES     Patient is is allergic to betadine [povidone iodine]; reglan [metoclopramide hcl]; and tetanus toxoids.     FAMILY HISTORY     Patient'sfamily history includes Asthma in her mother; Diabetes in her maternal grandmother and mother; Heart Disease in her maternal grandmother; High Blood Pressure in her mother. SOCIAL HISTORY     Patient  reports that she has never smoked. She has never used smokeless tobacco. She reports current alcohol use. She reports that she does not use drugs. PHYSICAL EXAM     ED TRIAGE VITALS  BP: 137/86, Temp: 97.6 °F (36.4 °C), Pulse: 77, Resp: 16, SpO2: 97 %  Physical Exam  Vitals signs and nursing note reviewed. Constitutional:       Appearance: She is well-developed. She is obese. She is ill-appearing. HENT:      Head: Normocephalic and atraumatic. Right Ear: Tympanic membrane is injected, erythematous and bulging. Left Ear: Tympanic membrane is injected. Nose: Nasal tenderness, mucosal edema and congestion present. Right Turbinates: Enlarged. Left Turbinates: Enlarged. Right Sinus: Frontal sinus tenderness present. Left Sinus: Frontal sinus tenderness present. Mouth/Throat:      Lips: Pink. Mouth: Mucous membranes are moist.      Pharynx: Pharyngeal swelling and posterior oropharyngeal erythema present. No oropharyngeal exudate. Tonsils: No tonsillar exudate. Eyes:      General:         Right eye: No discharge. Left eye: No discharge. Neck:      Musculoskeletal: Normal range of motion and neck supple. No muscular tenderness. Thyroid: No thyromegaly. Vascular: No carotid bruit. Cardiovascular:      Rate and Rhythm: Normal rate and regular rhythm. Pulses: Normal pulses. Heart sounds: Normal heart sounds. Pulmonary:      Effort: Pulmonary effort is normal. No respiratory distress. Breath sounds: Normal breath sounds. No wheezing or rales. Chest:      Chest wall: No tenderness. Abdominal:      General: Bowel sounds are normal. There is no distension. Palpations: Abdomen is soft. Tenderness: There is no abdominal tenderness.    Musculoskeletal: days Take with food. , Disp-10 tablet,R-0Print      benzonatate (TESSALON) 200 MG capsule Take 1 capsule by mouth 3 times daily as needed for Cough, Disp-30 capsule,R-0Print           Discharge Medication List as of 11/22/2020  4:36 PM          BLANQUITA Gomez - BLANQUITA Noonan CNP  11/22/20 2333

## 2020-11-22 NOTE — ED NOTES
Presents with c/o sinus congestion and drainage, starting 11/16/20. Started coughing yesterday.       Leesa Zamora RN  11/22/20 2391

## 2021-03-23 ENCOUNTER — HOSPITAL ENCOUNTER (OUTPATIENT)
Age: 36
Setting detail: SPECIMEN
Discharge: HOME | End: 2021-03-23
Payer: COMMERCIAL

## 2021-03-23 LAB
ABSOLUTE EOS #: 0.17 K/UL (ref 0–0.44)
ABSOLUTE IMMATURE GRANULOCYTE: <0.03 K/UL (ref 0–0.3)
ABSOLUTE LYMPH #: 2.42 K/UL (ref 1.1–3.7)
ABSOLUTE MONO #: 0.34 K/UL (ref 0.1–1.2)
ANION GAP SERPL CALCULATED.3IONS-SCNC: 13 MMOL/L (ref 9–17)
BASOPHILS # BLD: 1 % (ref 0–2)
BASOPHILS ABSOLUTE: 0.08 K/UL (ref 0–0.2)
BUN BLDV-MCNC: 9 MG/DL (ref 6–20)
BUN/CREAT BLD: NORMAL (ref 9–20)
CALCIUM SERPL-MCNC: 9.1 MG/DL (ref 8.6–10.4)
CHLORIDE BLD-SCNC: 104 MMOL/L (ref 98–107)
CO2: 22 MMOL/L (ref 20–31)
CREAT SERPL-MCNC: 0.61 MG/DL (ref 0.5–0.9)
DIFFERENTIAL TYPE: NORMAL
EOSINOPHILS RELATIVE PERCENT: 3 % (ref 1–4)
GFR AFRICAN AMERICAN: >60 ML/MIN
GFR NON-AFRICAN AMERICAN: >60 ML/MIN
GFR SERPL CREATININE-BSD FRML MDRD: NORMAL ML/MIN/{1.73_M2}
GFR SERPL CREATININE-BSD FRML MDRD: NORMAL ML/MIN/{1.73_M2}
GLUCOSE BLD-MCNC: 81 MG/DL (ref 70–99)
HCT VFR BLD CALC: 42 % (ref 36.3–47.1)
HEMOGLOBIN: 13.2 G/DL (ref 11.9–15.1)
IMMATURE GRANULOCYTES: 0 %
LYMPHOCYTES # BLD: 36 % (ref 24–43)
MCH RBC QN AUTO: 27.8 PG (ref 25.2–33.5)
MCHC RBC AUTO-ENTMCNC: 31.4 G/DL (ref 28.4–34.8)
MCV RBC AUTO: 88.6 FL (ref 82.6–102.9)
MONOCYTES # BLD: 5 % (ref 3–12)
NRBC AUTOMATED: 0 PER 100 WBC
PDW BLD-RTO: 12.7 % (ref 11.8–14.4)
PLATELET # BLD: 377 K/UL (ref 138–453)
PLATELET ESTIMATE: NORMAL
PMV BLD AUTO: 10.3 FL (ref 8.1–13.5)
POTASSIUM SERPL-SCNC: 3.9 MMOL/L (ref 3.7–5.3)
RBC # BLD: 4.74 M/UL (ref 3.95–5.11)
RBC # BLD: NORMAL 10*6/UL
SEG NEUTROPHILS: 55 % (ref 36–65)
SEGMENTED NEUTROPHILS ABSOLUTE COUNT: 3.8 K/UL (ref 1.5–8.1)
SODIUM BLD-SCNC: 139 MMOL/L (ref 135–144)
WBC # BLD: 6.8 K/UL (ref 3.5–11.3)
WBC # BLD: NORMAL 10*3/UL

## 2021-07-09 ENCOUNTER — HOSPITAL ENCOUNTER (OUTPATIENT)
Age: 36
Setting detail: SPECIMEN
Discharge: HOME | End: 2021-07-09
Payer: COMMERCIAL

## 2021-07-09 LAB — TSH SERPL DL<=0.05 MIU/L-ACNC: 0.55 MIU/L (ref 0.3–5)

## 2021-07-19 ENCOUNTER — HOSPITAL ENCOUNTER (OUTPATIENT)
Age: 36
Setting detail: SPECIMEN
Discharge: HOME | End: 2021-07-19
Payer: COMMERCIAL

## 2021-07-19 LAB
ALBUMIN SERPL-MCNC: 4 G/DL (ref 3.5–5.2)
ALBUMIN/GLOBULIN RATIO: 1.1 (ref 1–2.5)
ALP BLD-CCNC: 116 U/L (ref 35–104)
ALT SERPL-CCNC: 37 U/L (ref 5–33)
ANION GAP SERPL CALCULATED.3IONS-SCNC: 13 MMOL/L (ref 9–17)
AST SERPL-CCNC: 32 U/L
BILIRUB SERPL-MCNC: 0.24 MG/DL (ref 0.3–1.2)
BUN BLDV-MCNC: 10 MG/DL (ref 6–20)
BUN/CREAT BLD: ABNORMAL (ref 9–20)
CALCIUM SERPL-MCNC: 8.7 MG/DL (ref 8.6–10.4)
CHLORIDE BLD-SCNC: 105 MMOL/L (ref 98–107)
CHOLESTEROL/HDL RATIO: 4.1
CHOLESTEROL: 148 MG/DL
CO2: 20 MMOL/L (ref 20–31)
CREAT SERPL-MCNC: 0.52 MG/DL (ref 0.5–0.9)
FOLATE: 5.7 NG/ML
GFR AFRICAN AMERICAN: >60 ML/MIN
GFR NON-AFRICAN AMERICAN: >60 ML/MIN
GFR SERPL CREATININE-BSD FRML MDRD: ABNORMAL ML/MIN/{1.73_M2}
GFR SERPL CREATININE-BSD FRML MDRD: ABNORMAL ML/MIN/{1.73_M2}
GLUCOSE BLD-MCNC: 89 MG/DL (ref 70–99)
HCT VFR BLD CALC: 41.4 % (ref 36.3–47.1)
HDLC SERPL-MCNC: 36 MG/DL
HEMOGLOBIN: 12.6 G/DL (ref 11.9–15.1)
IRON SATURATION: 24 % (ref 20–55)
IRON: 81 UG/DL (ref 37–145)
LDL CHOLESTEROL: 77 MG/DL (ref 0–130)
MCH RBC QN AUTO: 27.3 PG (ref 25.2–33.5)
MCHC RBC AUTO-ENTMCNC: 30.4 G/DL (ref 28.4–34.8)
MCV RBC AUTO: 89.8 FL (ref 82.6–102.9)
NRBC AUTOMATED: 0 PER 100 WBC
PDW BLD-RTO: 12.8 % (ref 11.8–14.4)
PLATELET # BLD: 375 K/UL (ref 138–453)
PMV BLD AUTO: 10.4 FL (ref 8.1–13.5)
POTASSIUM SERPL-SCNC: 4.1 MMOL/L (ref 3.7–5.3)
RBC # BLD: 4.61 M/UL (ref 3.95–5.11)
SODIUM BLD-SCNC: 138 MMOL/L (ref 135–144)
TOTAL IRON BINDING CAPACITY: 343 UG/DL (ref 250–450)
TOTAL PROTEIN: 7.5 G/DL (ref 6.4–8.3)
TRIGL SERPL-MCNC: 176 MG/DL
UNSATURATED IRON BINDING CAPACITY: 262 UG/DL (ref 112–347)
VITAMIN B-12: 381 PG/ML (ref 232–1245)
VLDLC SERPL CALC-MCNC: ABNORMAL MG/DL (ref 1–30)
WBC # BLD: 6.5 K/UL (ref 3.5–11.3)

## 2022-06-01 ENCOUNTER — HOSPITAL ENCOUNTER (EMERGENCY)
Age: 37
Discharge: ANOTHER HOSPITAL | End: 2022-06-01
Payer: COMMERCIAL

## 2022-06-01 VITALS
BODY MASS INDEX: 36.61 KG/M2 | TEMPERATURE: 97.5 F | RESPIRATION RATE: 16 BRPM | OXYGEN SATURATION: 98 % | WEIGHT: 220 LBS | HEART RATE: 81 BPM | DIASTOLIC BLOOD PRESSURE: 90 MMHG | SYSTOLIC BLOOD PRESSURE: 124 MMHG

## 2022-06-01 DIAGNOSIS — R07.89 ATYPICAL CHEST PAIN: Primary | ICD-10-CM

## 2022-06-01 LAB
EKG ATRIAL RATE: 72 BPM
EKG P AXIS: -12 DEGREES
EKG P-R INTERVAL: 190 MS
EKG Q-T INTERVAL: 376 MS
EKG QRS DURATION: 86 MS
EKG QTC CALCULATION (BAZETT): 411 MS
EKG R AXIS: 31 DEGREES
EKG T AXIS: 30 DEGREES
EKG VENTRICULAR RATE: 72 BPM

## 2022-06-01 PROCEDURE — 99214 OFFICE O/P EST MOD 30 MIN: CPT | Performed by: NURSE PRACTITIONER

## 2022-06-01 PROCEDURE — 93005 ELECTROCARDIOGRAM TRACING: CPT | Performed by: NURSE PRACTITIONER

## 2022-06-01 PROCEDURE — G0463 HOSPITAL OUTPT CLINIC VISIT: HCPCS

## 2022-06-01 PROCEDURE — 99215 OFFICE O/P EST HI 40 MIN: CPT

## 2022-06-01 PROCEDURE — 93010 ELECTROCARDIOGRAM REPORT: CPT | Performed by: INTERNAL MEDICINE

## 2022-06-01 RX ORDER — BUTALBITAL, ACETAMINOPHEN AND CAFFEINE 300; 40; 50 MG/1; MG/1; MG/1
1 CAPSULE ORAL EVERY 4 HOURS PRN
COMMUNITY

## 2022-06-01 RX ORDER — IBUPROFEN 200 MG
200 TABLET ORAL EVERY 6 HOURS PRN
COMMUNITY

## 2022-06-01 RX ORDER — BUPROPION HYDROCHLORIDE 300 MG/1
300 TABLET ORAL EVERY MORNING
COMMUNITY

## 2022-06-01 RX ORDER — BUSPIRONE HYDROCHLORIDE 5 MG/1
5 TABLET ORAL 3 TIMES DAILY PRN
COMMUNITY

## 2022-06-01 RX ORDER — OMEPRAZOLE 20 MG/1
20 CAPSULE, DELAYED RELEASE ORAL DAILY
COMMUNITY

## 2022-06-01 ASSESSMENT — ENCOUNTER SYMPTOMS
ABDOMINAL PAIN: 0
VOMITING: 1
CHEST TIGHTNESS: 0
SHORTNESS OF BREATH: 1
NAUSEA: 1
DIARRHEA: 0
COUGH: 0

## 2022-06-01 ASSESSMENT — PAIN DESCRIPTION - ONSET: ONSET: PROGRESSIVE

## 2022-06-01 ASSESSMENT — PAIN DESCRIPTION - FREQUENCY: FREQUENCY: CONTINUOUS

## 2022-06-01 ASSESSMENT — PAIN - FUNCTIONAL ASSESSMENT
PAIN_FUNCTIONAL_ASSESSMENT: 0-10
PAIN_FUNCTIONAL_ASSESSMENT: PREVENTS OR INTERFERES SOME ACTIVE ACTIVITIES AND ADLS

## 2022-06-01 ASSESSMENT — PAIN SCALES - GENERAL: PAINLEVEL_OUTOF10: 4

## 2022-06-01 ASSESSMENT — PAIN DESCRIPTION - ORIENTATION: ORIENTATION: RIGHT

## 2022-06-01 ASSESSMENT — PAIN DESCRIPTION - LOCATION: LOCATION: CHEST

## 2022-06-01 ASSESSMENT — PAIN DESCRIPTION - DESCRIPTORS: DESCRIPTORS: PRESSURE;DISCOMFORT

## 2022-06-01 NOTE — ED TRIAGE NOTES
Pt to room 8 with her friend. She reports that she started having left sided chest \"pressure\" last week early in the week and reported it to her provider where she works at [employer] She reports she was told that her left lung sounded diminished and was prescribed prednisone which she took Tuesday through Saturday. She reports that the pain/pressure moved to the right side on Friday and states it is intermittent but lasts approx 1.5 to 2 hours at a time. She also reports that she vomited once yesterday and \"just doesn't feel right\".

## 2022-06-01 NOTE — ED PROVIDER NOTES
[address]       Chief Complaint   Patient presents with    Other     right sided chest pain     Emesis     yesterday       Nurses Notes reviewed and I agree except as noted in the HPI. HISTORY OF PRESENT ILLNESS   An Jarrett is a 39 y.o. female who presents for evaluation. She states that she started feeling pressure in her left chest last week. 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 Prednisone was prescribed and she took that until Saturday. Starting Friday the pain has moved into the right chest. Comes and goes, lasts 1-2 hours. Feels like pressure and takes her breath away when present. She states that the pain woke her from sleep this morning at 3:45 AM subsided and then returned at noon . Has had nausea and vomited yesterday. She denies previous contributing health history. Denies injury or trauma. Denies recent illness. She is on birth control via Depo Provera and a patch. Non smoker. No recent travel. The patient/patient representative has no other acute complaints at this time. REVIEW OF SYSTEMS     Review of Systems   Constitutional: Negative for chills, fatigue and fever. Respiratory: Positive for shortness of breath (with the chest pain). Negative for cough and chest tightness. Cardiovascular: Positive for chest pain and leg swelling (normal since childbirth). Negative for palpitations. Gastrointestinal: Positive for nausea and vomiting. Negative for abdominal pain and diarrhea. Skin: Negative for rash. Allergic/Immunologic: Negative for environmental allergies and food allergies. Neurological: Negative for headaches. Hematological: Negative for adenopathy.        PAST MEDICAL HISTORY         Diagnosis Date    Anxiety     Asthma     Diabetes mellitus (HCC)     Gestational    IBS (irritable bowel syndrome)     PONV (postoperative nausea and vomiting)     Prolonged emergence from general anesthesia     really sleepy    PVC's (premature ventricular contractions)     Tachycardia        SURGICAL HISTORY     Patient  has a past surgical history that includes Appendectomy; Tonsillectomy and adenoidectomy (d an); Dilation and curettage of uterus; Cholecystectomy ();  section (2020); hernia repair; and Tubal ligation. CURRENT MEDICATIONS       Previous Medications    ACETAMINOPHEN (TYLENOL) 500 MG TABLET    Take 1,000 mg by mouth every 6 hours as needed for Pain    ALBUTEROL SULFATE  (90 BASE) MCG/ACT INHALER    Inhale 2 puffs into the lungs every 4 hours as needed for Wheezing or Shortness of Breath    BENZONATATE (TESSALON) 200 MG CAPSULE    Take 1 capsule by mouth 3 times daily as needed for Cough    BUPROPION (WELLBUTRIN XL) 300 MG EXTENDED RELEASE TABLET    Take 300 mg by mouth every morning    BUSPIRONE (BUSPAR) 5 MG TABLET    Take 5 mg by mouth 3 times daily as needed    BUTALBITAL-APAP-CAFFEINE (FIORICET) -40 MG CAPS PER CAPSULE    Take 1 capsule by mouth every 4 hours as needed for Headaches    DICYCLOMINE HCL PO    Take by mouth    FLUTICASONE PROPIONATE (FLONASE NA)    by Nasal route    IBUPROFEN (ADVIL;MOTRIN) 200 MG TABLET    Take 200 mg by mouth every 6 hours as needed for Pain    NAPROXEN (NAPROSYN) 500 MG TABLET    Take 1 tablet by mouth 2 times daily    OMEPRAZOLE (PRILOSEC) 20 MG DELAYED RELEASE CAPSULE    Take 20 mg by mouth daily    PSEUDOEPHEDRINE HCL (SUDAFED PO)    Take by mouth       ALLERGIES     Patient is is allergic to betadine [povidone iodine], reglan [metoclopramide hcl], and tetanus toxoids. FAMILY HISTORY     Patient'sfamily history includes Asthma in her mother; Diabetes in her maternal grandmother and mother; Heart Disease in her maternal grandmother; High Blood Pressure in her mother. SOCIAL HISTORY     Patient  reports that she has never smoked.  She has never used smokeless tobacco. She reports current alcohol use. She reports that she does not use drugs. PHYSICAL EXAM     ED TRIAGE VITALS  BP: (!) 124/90, Temp: 97.5 °F (36.4 °C), Heart Rate: 81, Resp: 16, SpO2: 98 %  Physical Exam  Vitals and nursing note reviewed. Constitutional:       General: She is not in acute distress. Appearance: Normal appearance. She is well-developed and well-groomed. HENT:      Head: Normocephalic and atraumatic. Right Ear: External ear normal.      Left Ear: External ear normal.      Nose: Nose normal.      Mouth/Throat:      Lips: Pink. Mouth: Mucous membranes are moist.   Eyes:      Conjunctiva/sclera: Conjunctivae normal.      Right eye: Right conjunctiva is not injected. Left eye: Left conjunctiva is not injected. Pupils: Pupils are equal.   Cardiovascular:      Rate and Rhythm: Normal rate. Heart sounds: Normal heart sounds. Pulmonary:      Effort: Pulmonary effort is normal. No respiratory distress. Breath sounds: Normal breath sounds and air entry. Musculoskeletal:      Cervical back: Normal range of motion. Skin:     General: Skin is warm and dry. Findings: No rash (on exposed surfaces). Neurological:      Mental Status: She is alert and oriented to person, place, and time. Gait: Gait is intact. Psychiatric:         Mood and Affect: Mood normal.         Speech: Speech normal.         Behavior: Behavior is cooperative. DIAGNOSTIC RESULTS   Labs:  Abnormal Labs Reviewed - No data to display     IMAGING:  No orders to display     URGENT CARE COURSE:     Vitals:    06/01/22 1712   BP: (!) 124/90   Pulse: 81   Resp: 16   Temp: 97.5 °F (36.4 °C)   TempSrc: Temporal   SpO2: 98%   Weight: 220 lb (99.8 kg)       Medications - No data to display  PROCEDURES:  FINALIMPRESSION      1.  Atypical chest pain        DISPOSITION/PLAN   DISPOSITION Decision To Transfer 06/01/2022 05:11:02 PM     After reviewing the patients History of Present illness, Vital Signs,Clinical Findings,Comorbities, and Clinical Data obtained I discussed with the patient or patient representative that there is a very real potential for serious injury / illness and the patient will need to be transfer to a level of higher care for further evaluation and continued care. It was explained that this would provide the best care for the patient. The patient/patient representative are agreeable to the treatment plan and are agreeable to be transferred therefore, the patient will be transferred to [facility] ED for reevaluation and further management. Problem List Items Addressed This Visit     None      Visit Diagnoses     Atypical chest pain    -  Primary            PATIENT REFERRED TO:  [facility] ER  [address] [address]    Go directly to the [address] [facility] CA[facility], APRN - CNP    Please note that some or all of this chart was generated using [employer] voice recognition software. Although every effort was made to ensure the accuracy of this automated transcription, some errors in transcription may have occurred.         BLANQUITA Santiago CNP  06/01/22 4847

## 2022-09-19 ENCOUNTER — HOSPITAL ENCOUNTER (OUTPATIENT)
Age: 37
Setting detail: SPECIMEN
Discharge: HOME | End: 2022-09-19

## 2022-09-19 LAB
ABSOLUTE EOS #: 0.26 K/UL (ref 0–0.44)
ABSOLUTE IMMATURE GRANULOCYTE: <0.03 K/UL (ref 0–0.3)
ABSOLUTE LYMPH #: 2.67 K/UL (ref 1.1–3.7)
ABSOLUTE MONO #: 0.56 K/UL (ref 0.1–1.2)
ALBUMIN SERPL-MCNC: 4.4 G/DL (ref 3.5–5.2)
ALBUMIN/GLOBULIN RATIO: 1.4 (ref 1–2.5)
ALP BLD-CCNC: 123 U/L (ref 35–104)
ALT SERPL-CCNC: 28 U/L (ref 5–33)
ANION GAP SERPL CALCULATED.3IONS-SCNC: 12 MMOL/L (ref 9–17)
AST SERPL-CCNC: 22 U/L
BASOPHILS # BLD: 1 % (ref 0–2)
BASOPHILS ABSOLUTE: 0.09 K/UL (ref 0–0.2)
BILIRUB SERPL-MCNC: 0.3 MG/DL (ref 0.3–1.2)
BUN BLDV-MCNC: 9 MG/DL (ref 6–20)
CALCIUM SERPL-MCNC: 9.4 MG/DL (ref 8.6–10.4)
CHLORIDE BLD-SCNC: 103 MMOL/L (ref 98–107)
CHOLESTEROL/HDL RATIO: 3.8
CHOLESTEROL: 159 MG/DL
CO2: 23 MMOL/L (ref 20–31)
CREAT SERPL-MCNC: 0.72 MG/DL (ref 0.5–0.9)
EOSINOPHILS RELATIVE PERCENT: 3 % (ref 1–4)
GFR AFRICAN AMERICAN: >60 ML/MIN
GFR NON-AFRICAN AMERICAN: >60 ML/MIN
GFR SERPL CREATININE-BSD FRML MDRD: ABNORMAL ML/MIN/{1.73_M2}
GLUCOSE BLD-MCNC: 86 MG/DL (ref 70–99)
HCT VFR BLD CALC: 43.5 % (ref 36.3–47.1)
HDLC SERPL-MCNC: 42 MG/DL
HEMOGLOBIN: 13.2 G/DL (ref 11.9–15.1)
IMMATURE GRANULOCYTES: 0 %
LDL CHOLESTEROL: 89 MG/DL (ref 0–130)
LYMPHOCYTES # BLD: 32 % (ref 24–43)
MCH RBC QN AUTO: 26.8 PG (ref 25.2–33.5)
MCHC RBC AUTO-ENTMCNC: 30.3 G/DL (ref 28.4–34.8)
MCV RBC AUTO: 88.2 FL (ref 82.6–102.9)
MONOCYTES # BLD: 7 % (ref 3–12)
NRBC AUTOMATED: 0 PER 100 WBC
PDW BLD-RTO: 14.2 % (ref 11.8–14.4)
PLATELET # BLD: 395 K/UL (ref 138–453)
PMV BLD AUTO: 10.4 FL (ref 8.1–13.5)
POTASSIUM SERPL-SCNC: 4 MMOL/L (ref 3.7–5.3)
RBC # BLD: 4.93 M/UL (ref 3.95–5.11)
SEG NEUTROPHILS: 57 % (ref 36–65)
SEGMENTED NEUTROPHILS ABSOLUTE COUNT: 4.82 K/UL (ref 1.5–8.1)
SODIUM BLD-SCNC: 138 MMOL/L (ref 135–144)
TOTAL PROTEIN: 7.6 G/DL (ref 6.4–8.3)
TRIGL SERPL-MCNC: 142 MG/DL
TSH SERPL DL<=0.05 MIU/L-ACNC: 0.75 UIU/ML (ref 0.3–5)
VITAMIN D 25-HYDROXY: 14.1 NG/ML
WBC # BLD: 8.4 K/UL (ref 3.5–11.3)

## 2022-11-18 ENCOUNTER — HOSPITAL ENCOUNTER (OUTPATIENT)
Age: 37
Setting detail: SPECIMEN
Discharge: HOME | End: 2022-11-18

## 2022-11-18 LAB
ABSOLUTE EOS #: 0.21 K/UL (ref 0–0.44)
ABSOLUTE IMMATURE GRANULOCYTE: <0.03 K/UL (ref 0–0.3)
ABSOLUTE LYMPH #: 2.35 K/UL (ref 1.1–3.7)
ABSOLUTE MONO #: 0.42 K/UL (ref 0.1–1.2)
ALBUMIN SERPL-MCNC: 4.1 G/DL (ref 3.5–5.2)
ALBUMIN/GLOBULIN RATIO: 1.3 (ref 1–2.5)
ALP BLD-CCNC: 125 U/L (ref 35–104)
ALT SERPL-CCNC: 40 U/L (ref 5–33)
ANION GAP SERPL CALCULATED.3IONS-SCNC: 15 MMOL/L (ref 9–17)
AST SERPL-CCNC: 30 U/L
BASOPHILS # BLD: 1 % (ref 0–2)
BASOPHILS ABSOLUTE: 0.07 K/UL (ref 0–0.2)
BILIRUB SERPL-MCNC: 0.3 MG/DL (ref 0.3–1.2)
BUN BLDV-MCNC: 10 MG/DL (ref 6–20)
CALCIUM SERPL-MCNC: 8.7 MG/DL (ref 8.6–10.4)
CHLORIDE BLD-SCNC: 106 MMOL/L (ref 98–107)
CHOLESTEROL/HDL RATIO: 3.7
CHOLESTEROL: 153 MG/DL
CO2: 20 MMOL/L (ref 20–31)
CREAT SERPL-MCNC: 0.7 MG/DL (ref 0.5–0.9)
EOSINOPHILS RELATIVE PERCENT: 3 % (ref 1–4)
GFR SERPL CREATININE-BSD FRML MDRD: >60 ML/MIN/1.73M2
GLUCOSE BLD-MCNC: 110 MG/DL (ref 70–99)
HCT VFR BLD CALC: 45.9 % (ref 36.3–47.1)
HDLC SERPL-MCNC: 41 MG/DL
HEMOGLOBIN: 13.7 G/DL (ref 11.9–15.1)
IMMATURE GRANULOCYTES: 0 %
LDL CHOLESTEROL: 90 MG/DL (ref 0–130)
LYMPHOCYTES # BLD: 30 % (ref 24–43)
MCH RBC QN AUTO: 26.5 PG (ref 25.2–33.5)
MCHC RBC AUTO-ENTMCNC: 29.8 G/DL (ref 28.4–34.8)
MCV RBC AUTO: 88.8 FL (ref 82.6–102.9)
MONOCYTES # BLD: 5 % (ref 3–12)
NRBC AUTOMATED: 0 PER 100 WBC
PDW BLD-RTO: 13 % (ref 11.8–14.4)
PLATELET # BLD: 465 K/UL (ref 138–453)
PMV BLD AUTO: 10.3 FL (ref 8.1–13.5)
POTASSIUM SERPL-SCNC: 4.4 MMOL/L (ref 3.7–5.3)
RBC # BLD: 5.17 M/UL (ref 3.95–5.11)
SEG NEUTROPHILS: 61 % (ref 36–65)
SEGMENTED NEUTROPHILS ABSOLUTE COUNT: 4.67 K/UL (ref 1.5–8.1)
SODIUM BLD-SCNC: 141 MMOL/L (ref 135–144)
TOTAL PROTEIN: 7.3 G/DL (ref 6.4–8.3)
TRIGL SERPL-MCNC: 112 MG/DL
TSH SERPL DL<=0.05 MIU/L-ACNC: 1.09 UIU/ML (ref 0.3–5)
VITAMIN D 25-HYDROXY: 22.4 NG/ML
WBC # BLD: 7.7 K/UL (ref 3.5–11.3)

## 2024-02-12 ENCOUNTER — HOSPITAL ENCOUNTER (EMERGENCY)
Age: 39
Discharge: HOME | End: 2024-02-12
Payer: COMMERCIAL

## 2024-02-12 ENCOUNTER — APPOINTMENT (OUTPATIENT)
Dept: CT IMAGING | Age: 39
End: 2024-02-12
Payer: COMMERCIAL

## 2024-02-12 VITALS
OXYGEN SATURATION: 97 % | HEIGHT: 65 IN | RESPIRATION RATE: 17 BRPM | SYSTOLIC BLOOD PRESSURE: 109 MMHG | TEMPERATURE: 98.8 F | DIASTOLIC BLOOD PRESSURE: 74 MMHG | HEART RATE: 64 BPM | BODY MASS INDEX: 35.82 KG/M2 | WEIGHT: 215 LBS

## 2024-02-12 DIAGNOSIS — K52.9 GASTROENTERITIS: Primary | ICD-10-CM

## 2024-02-12 LAB
ALBUMIN SERPL BCG-MCNC: 4.2 G/DL (ref 3.5–5.1)
ALP SERPL-CCNC: 109 U/L (ref 38–126)
ALT SERPL W/O P-5'-P-CCNC: 33 U/L (ref 11–66)
AST SERPL-CCNC: 22 U/L (ref 5–40)
BASOPHILS ABSOLUTE: 0 THOU/MM3 (ref 0–0.1)
BASOPHILS NFR BLD AUTO: 0.8 %
BILIRUB CONJ SERPL-MCNC: < 0.2 MG/DL (ref 0–0.3)
BILIRUB SERPL-MCNC: 0.3 MG/DL (ref 0.3–1.2)
BUN SERPL-MCNC: 12 MG/DL (ref 7–22)
CALCIUM SERPL-MCNC: 9 MG/DL (ref 8.5–10.5)
CHLORIDE SERPL-SCNC: 101 MEQ/L (ref 98–111)
CO2 SERPL-SCNC: 24 MEQ/L (ref 23–33)
CREAT SERPL-MCNC: 0.8 MG/DL (ref 0.4–1.2)
DEPRECATED RDW RBC AUTO: 40.6 FL (ref 35–45)
EOSINOPHIL NFR BLD AUTO: 1.3 %
EOSINOPHILS ABSOLUTE: 0.1 THOU/MM3 (ref 0–0.4)
ERYTHROCYTE [DISTWIDTH] IN BLOOD BY AUTOMATED COUNT: 13.8 % (ref 11.5–14.5)
GFR SERPL CREATININE-BSD FRML MDRD: > 60 ML/MIN/1.73M2
GLUCOSE SERPL-MCNC: 88 MG/DL (ref 70–108)
HCT VFR BLD AUTO: 40.6 % (ref 37–47)
HGB BLD-MCNC: 13 GM/DL (ref 12–16)
IMM GRANULOCYTES # BLD AUTO: 0.01 THOU/MM3 (ref 0–0.07)
IMM GRANULOCYTES NFR BLD AUTO: 0.2 %
LIPASE SERPL-CCNC: 23.9 U/L (ref 5.6–51.3)
LYMPHOCYTES ABSOLUTE: 2.2 THOU/MM3 (ref 1–4.8)
LYMPHOCYTES NFR BLD AUTO: 37 %
MCH RBC QN AUTO: 26.2 PG (ref 26–33)
MCHC RBC AUTO-ENTMCNC: 32 GM/DL (ref 32.2–35.5)
MCV RBC AUTO: 81.7 FL (ref 81–99)
MONOCYTES ABSOLUTE: 0.6 THOU/MM3 (ref 0.4–1.3)
MONOCYTES NFR BLD AUTO: 9.5 %
NEUTROPHILS NFR BLD AUTO: 51.2 %
NRBC BLD AUTO-RTO: 0 /100 WBC
OSMOLALITY SERPL CALC.SUM OF ELEC: 276.7 MOSMOL/KG (ref 275–300)
PLATELET # BLD AUTO: 367 THOU/MM3 (ref 130–400)
PMV BLD AUTO: 10.2 FL (ref 9.4–12.4)
POTASSIUM SERPL-SCNC: 3.2 MEQ/L (ref 3.5–5.2)
PROT SERPL-MCNC: 7.4 G/DL (ref 6.1–8)
RBC # BLD AUTO: 4.97 MILL/MM3 (ref 4.2–5.4)
SEGMENTED NEUTROPHILS ABSOLUTE COUNT: 3.1 THOU/MM3 (ref 1.8–7.7)
SODIUM SERPL-SCNC: 139 MEQ/L (ref 135–145)
WBC # BLD AUTO: 6 THOU/MM3 (ref 4.8–10.8)

## 2024-02-12 PROCEDURE — 82248 BILIRUBIN DIRECT: CPT

## 2024-02-12 PROCEDURE — 6360000004 HC RX CONTRAST MEDICATION: Performed by: NURSE PRACTITIONER

## 2024-02-12 PROCEDURE — 74177 CT ABD & PELVIS W/CONTRAST: CPT

## 2024-02-12 PROCEDURE — 85025 COMPLETE CBC W/AUTO DIFF WBC: CPT

## 2024-02-12 PROCEDURE — 6360000002 HC RX W HCPCS: Performed by: NURSE PRACTITIONER

## 2024-02-12 PROCEDURE — 2580000003 HC RX 258: Performed by: NURSE PRACTITIONER

## 2024-02-12 PROCEDURE — 83690 ASSAY OF LIPASE: CPT

## 2024-02-12 PROCEDURE — 80053 COMPREHEN METABOLIC PANEL: CPT

## 2024-02-12 PROCEDURE — 99285 EMERGENCY DEPT VISIT HI MDM: CPT

## 2024-02-12 PROCEDURE — 36415 COLL VENOUS BLD VENIPUNCTURE: CPT

## 2024-02-12 PROCEDURE — 96374 THER/PROPH/DIAG INJ IV PUSH: CPT

## 2024-02-12 RX ORDER — 0.9 % SODIUM CHLORIDE 0.9 %
1000 INTRAVENOUS SOLUTION INTRAVENOUS ONCE
Status: COMPLETED | OUTPATIENT
Start: 2024-02-12 | End: 2024-02-12

## 2024-02-12 RX ORDER — DICYCLOMINE HCL 20 MG
20 TABLET ORAL 4 TIMES DAILY
Qty: 40 TABLET | Refills: 0 | Status: SHIPPED | OUTPATIENT
Start: 2024-02-12 | End: 2024-02-22

## 2024-02-12 RX ORDER — ONDANSETRON 4 MG/1
4 TABLET, ORALLY DISINTEGRATING ORAL 3 TIMES DAILY PRN
Qty: 21 TABLET | Refills: 0 | Status: SHIPPED | OUTPATIENT
Start: 2024-02-12

## 2024-02-12 RX ORDER — ONDANSETRON 2 MG/ML
4 INJECTION INTRAMUSCULAR; INTRAVENOUS ONCE
Status: COMPLETED | OUTPATIENT
Start: 2024-02-12 | End: 2024-02-12

## 2024-02-12 RX ADMIN — ONDANSETRON 4 MG: 2 INJECTION INTRAMUSCULAR; INTRAVENOUS at 18:04

## 2024-02-12 RX ADMIN — SODIUM CHLORIDE 1000 ML: 9 INJECTION, SOLUTION INTRAVENOUS at 18:03

## 2024-02-12 RX ADMIN — IOPAMIDOL 80 ML: 755 INJECTION, SOLUTION INTRAVENOUS at 17:54

## 2024-02-12 NOTE — ED NOTES
Pt updated on POC. Pt medicated per MAR. No needs expressed at this time. Respirations even and unlabored, call light within reach. VSS

## 2024-02-12 NOTE — ED TRIAGE NOTES
Pt presents to the ER with c/o abdominal pain since Friday. Pt also reports emesis and diarrhea. Pt denies fevers. Pt is alert and oriented, respirations even and unlabored. VSS

## 2024-02-13 NOTE — ED NOTES
Pt resting in bed, respirations even and unlabored. No needs expressed at this time. Call light within reach. VSS

## 2024-02-16 NOTE — ED PROVIDER NOTES
agrees.    Physical assessment findings, diagnostic testing(s) if applicable, and vital signs reviewed with patient/patient representative.  Questions answered.   Medications asdirected, including OTC medications for supportive care.   Education provided on medications.  Differential diagnosis(s) discussed with patient/patient representative.  Home care/self care instructions reviewed withpatient/patient representative.  Patient is to follow-up with family care provider in 2-3 days if no improvement.  Patient is to go to the emergency department if symptoms worsen.  Patient/patient representative isaware of care plan, questions answered, verbalizes understanding and is in agreement.     ED Medications administered this visit:  (None if blank)  Medications   sodium chloride 0.9 % bolus 1,000 mL (0 mLs IntraVENous Stopped 2/12/24 1942)   ondansetron (ZOFRAN) injection 4 mg (4 mg IntraVENous Given 2/12/24 1804)   iopamidol (ISOVUE-370) 76 % injection 80 mL (80 mLs IntraVENous Given 2/12/24 1754)         CONSULTS:  None    PROCEDURES: (None if blank)  Procedures:     CRITICAL CARE: (None if blank)      DISCHARGE PRESCRIPTIONS: (None if blank)  Discharge Medication List as of 2/12/2024  7:38 PM        START taking these medications    Details   ondansetron (ZOFRAN-ODT) 4 MG disintegrating tablet Take 1 tablet by mouth 3 times daily as needed for Nausea or Vomiting, Disp-21 tablet, R-0Normal             FINAL IMPRESSION      1. Gastroenteritis          DISPOSITION/PLAN   DISPOSITION Decision To Discharge 02/12/2024 07:33:42 PM      OUTPATIENT FOLLOW UP THE PATIENT:  Madelyn Porter APRN - CNP  [address] [facility] [address]  [address] [facility] [address]  [phone]    Schedule an appointment as soon as possible for a visit in 2 days  For follow up      BLANQUITA Patterson CNP, Kristy J, APRN - CNP  02/16/24 0714

## 2024-06-22 ENCOUNTER — APPOINTMENT (OUTPATIENT)
Dept: CT IMAGING | Age: 39
End: 2024-06-22
Payer: COMMERCIAL

## 2024-06-22 ENCOUNTER — HOSPITAL ENCOUNTER (EMERGENCY)
Age: 39
Discharge: HOME | End: 2024-06-22
Attending: EMERGENCY MEDICINE
Payer: COMMERCIAL

## 2024-06-22 VITALS
TEMPERATURE: 98 F | HEART RATE: 81 BPM | RESPIRATION RATE: 16 BRPM | HEIGHT: 65 IN | OXYGEN SATURATION: 94 % | BODY MASS INDEX: 36.99 KG/M2 | SYSTOLIC BLOOD PRESSURE: 124 MMHG | DIASTOLIC BLOOD PRESSURE: 72 MMHG | WEIGHT: 222 LBS

## 2024-06-22 DIAGNOSIS — G43.809 OTHER MIGRAINE WITHOUT STATUS MIGRAINOSUS, NOT INTRACTABLE: Primary | ICD-10-CM

## 2024-06-22 PROCEDURE — 6360000002 HC RX W HCPCS: Performed by: EMERGENCY MEDICINE

## 2024-06-22 PROCEDURE — 96375 TX/PRO/DX INJ NEW DRUG ADDON: CPT

## 2024-06-22 PROCEDURE — 70450 CT HEAD/BRAIN W/O DYE: CPT

## 2024-06-22 PROCEDURE — 96365 THER/PROPH/DIAG IV INF INIT: CPT

## 2024-06-22 PROCEDURE — 99284 EMERGENCY DEPT VISIT MOD MDM: CPT

## 2024-06-22 PROCEDURE — 2580000003 HC RX 258: Performed by: EMERGENCY MEDICINE

## 2024-06-22 RX ORDER — PROCHLORPERAZINE EDISYLATE 5 MG/ML
10 INJECTION INTRAMUSCULAR; INTRAVENOUS ONCE
Status: COMPLETED | OUTPATIENT
Start: 2024-06-22 | End: 2024-06-22

## 2024-06-22 RX ORDER — KETOROLAC TROMETHAMINE 30 MG/ML
30 INJECTION, SOLUTION INTRAMUSCULAR; INTRAVENOUS ONCE
Status: COMPLETED | OUTPATIENT
Start: 2024-06-22 | End: 2024-06-22

## 2024-06-22 RX ORDER — 0.9 % SODIUM CHLORIDE 0.9 %
1000 INTRAVENOUS SOLUTION INTRAVENOUS ONCE
Status: COMPLETED | OUTPATIENT
Start: 2024-06-22 | End: 2024-06-22

## 2024-06-22 RX ORDER — MAGNESIUM SULFATE IN WATER 40 MG/ML
2000 INJECTION, SOLUTION INTRAVENOUS ONCE
Status: COMPLETED | OUTPATIENT
Start: 2024-06-22 | End: 2024-06-22

## 2024-06-22 RX ORDER — DIPHENHYDRAMINE HYDROCHLORIDE 50 MG/ML
25 INJECTION INTRAMUSCULAR; INTRAVENOUS ONCE
Status: COMPLETED | OUTPATIENT
Start: 2024-06-22 | End: 2024-06-22

## 2024-06-22 RX ADMIN — KETOROLAC TROMETHAMINE 30 MG: 30 INJECTION, SOLUTION INTRAMUSCULAR at 22:21

## 2024-06-22 RX ADMIN — DIPHENHYDRAMINE HYDROCHLORIDE 25 MG: 50 INJECTION INTRAMUSCULAR; INTRAVENOUS at 22:21

## 2024-06-22 RX ADMIN — SODIUM CHLORIDE 1000 ML: 9 INJECTION, SOLUTION INTRAVENOUS at 22:27

## 2024-06-22 RX ADMIN — MAGNESIUM SULFATE HEPTAHYDRATE 2000 MG: 40 INJECTION, SOLUTION INTRAVENOUS at 22:25

## 2024-06-22 RX ADMIN — PROCHLORPERAZINE EDISYLATE 10 MG: 5 INJECTION INTRAMUSCULAR; INTRAVENOUS at 22:21

## 2024-06-22 ASSESSMENT — PAIN SCALES - GENERAL: PAINLEVEL_OUTOF10: 9

## 2024-06-22 ASSESSMENT — PAIN - FUNCTIONAL ASSESSMENT: PAIN_FUNCTIONAL_ASSESSMENT: 0-10

## 2024-06-22 ASSESSMENT — PAIN DESCRIPTION - DESCRIPTORS: DESCRIPTORS: PRESSURE

## 2024-06-22 ASSESSMENT — PAIN DESCRIPTION - LOCATION: LOCATION: HEAD

## 2024-06-23 NOTE — ED NOTES
Pt vitals collected. Pt resting in bed with eyes closed at this time. Pt respirations easy and unlabored. Call light in reach.

## 2024-06-23 NOTE — ED TRIAGE NOTES
PT to the ED with a migraine. PT states she has had a headache for about a month now but pain has increased since Thursday. Pt states pain is a pressure feeling. PT states that she has taken ibuprofen, tylenol and Fioricet today. PT notes she is having some dizziness with headache.

## 2024-06-23 NOTE — ED PROVIDER NOTES
temperature is 98 °F (36.7 °C). Her blood pressure is 124/72 and her pulse is 81. Her respiration is 16 and oxygen saturation is 94%.   Physical Exam  Vitals and nursing note reviewed.   Constitutional:       Appearance: Normal appearance. She is well-developed.   HENT:      Head: Normocephalic and atraumatic.      Nose: Nose normal.      Mouth/Throat:      Mouth: Mucous membranes are moist.   Eyes:      General: No scleral icterus.        Right eye: No discharge.         Left eye: No discharge.      Pupils: Pupils are equal, round, and reactive to light.   Neck:      Trachea: No tracheal deviation.   Cardiovascular:      Rate and Rhythm: Normal rate and regular rhythm.      Heart sounds: Normal heart sounds. No murmur heard.  Pulmonary:      Effort: Pulmonary effort is normal. No respiratory distress.      Breath sounds: No stridor. No wheezing, rhonchi or rales.   Abdominal:      General: Bowel sounds are normal. There is no distension.      Palpations: Abdomen is soft.      Tenderness: There is no abdominal tenderness. There is no guarding or rebound.   Musculoskeletal:         General: No swelling or tenderness.      Cervical back: Normal range of motion and neck supple. No rigidity.   Skin:     General: Skin is warm and dry.   Neurological:      Mental Status: She is alert and oriented to person, place, and time.      Cranial Nerves: No cranial nerve deficit.      Sensory: No sensory deficit.      Motor: No weakness.      Comments:   No nuchal rigidity, negative meningeal signs.   Psychiatric:         Behavior: Behavior normal.         Thought Content: Thought content normal.         Judgment: Judgment normal.       FORMAL DIAGNOSTIC RESULTS   RADIOLOGY: Interpretation per the Radiologist below, if available at the time of this note (none if blank):  CT HEAD WO CONTRAST   Final Result   1. No acute intracranial abnormality.      This document has been electronically signed by: Jerel Mcknight MD on    06/22/2024

## 2025-01-07 ASSESSMENT — RHEUMATOLOGY NEW PATIENT QUESTIONNAIRE
SHORTNESS OF BREATH: N
DIFFICULTY SWALLOWING: N
UNEXPLAINED HEARING LOSS: N
DEPRESSION: N
STOMACH PAIN: Y
DIFFICULTY STAYING ASLEEP: Y
UNUSUAL FATIGUE: Y
HOARSE VOICE: N
DIFFICULTY FALLING ASLEEP: Y
UNUSUAL BLEEDING: N
UNEXPLAINED WEIGHT CHANGE: N
EYE DRYNESS: N
FEVER: N
SUN SENSITIVE (SUN ALLERGY): N
PAIN OR BURNING ON URINATION: N
SWOLLEN OR TENDER GLANDS: N
BLOOD IN STOOLS: N
BLACK STOOLS: N
EXCESSIVE HAIR LOSS (MORE THAN YOUR NORM): Y
HEADACHES: Y
RASH: N
ANEMIA: N
ABNORMAL URINE: N
JOINT PAIN: Y
COUGH: N
EASILY LOSING TEMPER: N
SKIN REDNESS: Y
MORNING STIFFNESS: N
JOINT SWELLING: Y
SORES IN MOUTH OR NOSE: N
DOUBLE OR BLURRED VISION: N
MUSCLE WEAKNESS: N
NIGHT SWEATS: N
DIFFICULTY BREATHING LYING DOWN: N
EYE REDNESS: N
HEARTBURN OR REFLUX: N
RASH OR ULCERS: N
DRYNESS OF MOUTH: N
FAINTING: N
ANXIETY: Y
SWOLLEN LEGS OR FEET: Y
VAGINAL DRYNESS: N
BEHAVIORAL CHANGES: N
COLOR CHANGES OF HANDS OR FEET IN THE COLD: N
MEMORY LOSS: N
PERSISTENT DIARRHEA: N
NUMBNESS OR TINGLING IN HANDS OR FEET: N
INCREASED SUSCEPTIBILITY TO INFECTION: N
SKIN TIGHTNESS: N
LOSS OF VISION: N
LIST JOINTS AFFECTED BY SWELLING IN THE PAST MONTH: KNEES AND HANDS
VOMITING OF BLOOD OR COFFEE GROUND CONSISTENCY MATERIAL: N
UNUSUALLY RAPID OR SLOWED HEART RATE: N
CHEST PAIN: N
AGITATION: N
JAUNDICE: N
EYE PAIN: N
LOSS OF CONSCIOUSNESS: N
NAUSEA: Y
SEIZURES: N
NODULES/BUMPS: N
EASY BRUISING: N

## 2025-01-08 ENCOUNTER — LAB (OUTPATIENT)
Dept: LAB | Age: 40
End: 2025-01-08

## 2025-01-08 ENCOUNTER — OFFICE VISIT (OUTPATIENT)
Age: 40
End: 2025-01-08
Payer: COMMERCIAL

## 2025-01-08 VITALS
HEIGHT: 65 IN | OXYGEN SATURATION: 97 % | HEART RATE: 66 BPM | BODY MASS INDEX: 37.47 KG/M2 | WEIGHT: 224.9 LBS | SYSTOLIC BLOOD PRESSURE: 122 MMHG | DIASTOLIC BLOOD PRESSURE: 78 MMHG

## 2025-01-08 DIAGNOSIS — R53.83 FATIGUE, UNSPECIFIED TYPE: ICD-10-CM

## 2025-01-08 DIAGNOSIS — R76.8 POSITIVE ANA (ANTINUCLEAR ANTIBODY): ICD-10-CM

## 2025-01-08 DIAGNOSIS — M25.50 MULTIPLE JOINT PAIN: ICD-10-CM

## 2025-01-08 DIAGNOSIS — R76.8 POSITIVE ANA (ANTINUCLEAR ANTIBODY): Primary | ICD-10-CM

## 2025-01-08 LAB — 25(OH)D3 SERPL-MCNC: 9 NG/ML (ref 30–100)

## 2025-01-08 PROCEDURE — 99204 OFFICE O/P NEW MOD 45 MIN: CPT | Performed by: INTERNAL MEDICINE

## 2025-01-08 ASSESSMENT — ENCOUNTER SYMPTOMS
VOMITING: 0
ABDOMINAL PAIN: 0
COUGH: 0
NAUSEA: 1
BLOOD IN STOOL: 0
SHORTNESS OF BREATH: 0

## 2025-01-08 NOTE — PATIENT INSTRUCTIONS
-- try magnesium supplement (e.g. magnesium oxide 400 mg daily). This may help with headaches and muscle soreness.  -- suggest taking vitamin B12 1,000 mcg once weekly

## 2025-01-08 NOTE — PROGRESS NOTES
[facility]   Rheumatology Clinic Note      2025       Reason for Consult:  positive VANNESSA  Requesting Physician:  Talya Mcdermott*     CHIEF COMPLAINT:    Chief Complaint   Patient presents with    New Patient     Abnormal finding of blood chemistry    Joint Pain     Symptoms present for about 4 years. Had excessive swelling during fourth pregnancy.   Patient does have swelling bilateral feet and knees. States that it feet will become very heavy to where she is dragging. No pain,but feels more like aches and throbbing. Feels as if liver is swelling.            HISTORY OF PRESENT ILLNESS:    39 y.o. female presents today for evaluation of positive VANNESSA. This test was part of work up of excessive fatigue. The fatigue has progressively worsen over past year.    Reports having painful swelling in her feet and knees. This has been ongoing since her last pregnancy 4 years ago. Episodes of severe pain in the knees that improves by the next day. Has low grade constant pain in her knees.     Has skin sensitivity. Easily breakouts with rashes.  No photosensitivity.  Chronic headaches.  No oral ulcers. No alopecia.  No SOB.  No recent unexplained weight loss.    Has interrupted sleep. On average, she gets around 5-6 hours of sleep per night.    Maternal aunt was recently diagnosed with SLE.        Past Medical History:     has a past medical history of Anxiety, Asthma, Diabetes mellitus (HCC), IBS (irritable bowel syndrome), PONV (postoperative nausea and vomiting), Prolonged emergence from general anesthesia, PVC's (premature ventricular contractions), and Tachycardia.    Past Surgical History:     has a past surgical history that includes Appendectomy; Tonsillectomy and adenoidectomy (d an); Dilation and curettage of uterus; Cholecystectomy ();  section (2020); hernia repair; Tubal ligation; and Robotic assisted hysterectomy.    Current Medications:      Current Outpatient

## 2025-01-09 ENCOUNTER — TELEPHONE (OUTPATIENT)
Age: 40
End: 2025-01-09

## 2025-01-09 DIAGNOSIS — E55.9 VITAMIN D DEFICIENCY: Primary | ICD-10-CM

## 2025-01-09 LAB
C3C SERPL-MCNC: 163 MG/DL (ref 90–180)
C4 SERPL-MCNC: 17 MG/DL (ref 10–40)

## 2025-01-09 RX ORDER — ERGOCALCIFEROL 1.25 MG/1
50000 CAPSULE, LIQUID FILLED ORAL WEEKLY
Qty: 12 CAPSULE | Refills: 1 | Status: SHIPPED | OUTPATIENT
Start: 2025-01-09

## 2025-01-16 ENCOUNTER — TELEPHONE (OUTPATIENT)
Age: 40
End: 2025-01-16

## 2025-01-16 NOTE — TELEPHONE ENCOUNTER
Please inform pt that AVISE panel came back. She has very low positive rheumatoid factor on this panel. It was negative in other lab facility.    I'd like for her to continue taking vitamin D supplement and schedule a follow up appointment in 6 weeks. Severe vitamin D deficiency can present with joint pain. I'd like to reassess her joint pain in 6 weeks.

## 2025-04-28 ENCOUNTER — HOSPITAL ENCOUNTER (EMERGENCY)
Age: 40
Discharge: HOME | End: 2025-04-28
Payer: COMMERCIAL

## 2025-04-28 VITALS
WEIGHT: 227 LBS | SYSTOLIC BLOOD PRESSURE: 131 MMHG | HEIGHT: 64 IN | TEMPERATURE: 98.1 F | OXYGEN SATURATION: 96 % | BODY MASS INDEX: 38.76 KG/M2 | DIASTOLIC BLOOD PRESSURE: 89 MMHG | HEART RATE: 68 BPM | RESPIRATION RATE: 18 BRPM

## 2025-04-28 DIAGNOSIS — J40 SINOBRONCHITIS: Primary | ICD-10-CM

## 2025-04-28 DIAGNOSIS — J32.9 SINOBRONCHITIS: Primary | ICD-10-CM

## 2025-04-28 PROCEDURE — 99213 OFFICE O/P EST LOW 20 MIN: CPT

## 2025-04-28 RX ORDER — PREDNISONE 20 MG/1
20 TABLET ORAL 2 TIMES DAILY
Qty: 10 TABLET | Refills: 0 | Status: SHIPPED | OUTPATIENT
Start: 2025-04-28 | End: 2025-05-03

## 2025-04-28 RX ORDER — AZITHROMYCIN 500 MG/1
500 TABLET, FILM COATED ORAL DAILY
Qty: 5 TABLET | Refills: 0 | Status: SHIPPED | OUTPATIENT
Start: 2025-04-28 | End: 2025-05-03

## 2025-04-28 ASSESSMENT — PAIN DESCRIPTION - PAIN TYPE: TYPE: ACUTE PAIN

## 2025-04-28 ASSESSMENT — PAIN DESCRIPTION - INTENSITY: RATING_2: 3

## 2025-04-28 ASSESSMENT — PAIN SCALES - GENERAL: PAINLEVEL_OUTOF10: 3

## 2025-04-28 ASSESSMENT — PAIN DESCRIPTION - DESCRIPTORS
DESCRIPTORS_2: ACHING
DESCRIPTORS: ACHING

## 2025-04-28 ASSESSMENT — PAIN DESCRIPTION - ORIENTATION
ORIENTATION_2: RIGHT;LEFT
ORIENTATION: RIGHT;LEFT;UPPER

## 2025-04-28 ASSESSMENT — ENCOUNTER SYMPTOMS
SINUS PAIN: 1
SINUS PRESSURE: 1
VOICE CHANGE: 1
COUGH: 1

## 2025-04-28 ASSESSMENT — PAIN - FUNCTIONAL ASSESSMENT: PAIN_FUNCTIONAL_ASSESSMENT: 0-10

## 2025-04-28 ASSESSMENT — PAIN DESCRIPTION - LOCATION
LOCATION: BACK
LOCATION_2: FACE

## 2025-04-28 ASSESSMENT — PAIN DESCRIPTION - FREQUENCY: FREQUENCY: CONTINUOUS

## 2025-04-28 NOTE — DISCHARGE INSTRUCTIONS
Medication as prescribed and discussed.   Increase water intake, frequent hand washing.  Tylenol / Ibuprofen as needed for fever and or pain.  Follow up with PCP in 3-5 days if no improvement or sooner with worsening symptoms.

## 2025-04-28 NOTE — ED PROVIDER NOTES
[facility]RGENT [facility]  Urgent Care Encounter       CHIEF COMPLAINT       Chief Complaint   Patient presents with    Facial Pain     Sinus congestion and pressure onset x 1 week    Cough     Progressively worse especially at night    Hoarse       Nurses Notes reviewed and I agree except as noted in the HPI.  HISTORY OF PRESENT ILLNESS   Qi Malik is a 39 y.o. female who presents with concerns of nasal congestion and sinus pressure for the past week. Patient also reports a cough as well that is worse at night. Reports use of allegra, sudafed, dayquil, nyquil, flonase, and afrin.     HPI    REVIEW OF SYSTEMS     Review of Systems   Constitutional:  Positive for fatigue.   HENT:  Positive for congestion, sinus pressure, sinus pain and voice change.    Respiratory:  Positive for cough.    All other systems reviewed and are negative.      PAST MEDICAL HISTORY         Diagnosis Date    Anxiety     Asthma     Diabetes mellitus (HCC)     Gestational    IBS (irritable bowel syndrome)     PONV (postoperative nausea and vomiting)     Prolonged emergence from general anesthesia     really sleepy    PVC's (premature ventricular contractions)     Tachycardia        SURGICALHISTORY     Patient  has a past surgical history that includes Appendectomy; Tonsillectomy and adenoidectomy (d an); Dilation and curettage of uterus; Cholecystectomy ();  section (2020); hernia repair; Tubal ligation; Robotic assisted hysterectomy; and Hysterectomy.    CURRENT MEDICATIONS       Discharge Medication List as of 2025  4:26 PM        CONTINUE these medications which have NOT CHANGED    Details   vitamin D (ERGOCALCIFEROL) 1.25 MG (90443 UT) CAPS capsule Take 1 capsule by mouth once a week, Disp-12 capsule, R-1Normal      ondansetron (ZOFRAN-ODT) 4 MG disintegrating tablet Take 1 tablet by mouth 3 times daily as needed for Nausea or Vomiting, Disp-21 tablet, R-0Normal      dicyclomine (BENTYL) 20 MG tablet

## 2025-04-28 NOTE — ED TRIAGE NOTES
To room with family. Pt c/o sinus congestion and pressure onset x 1 week with progressively worse cough especially at night. Pt also has hoarse voice. Pt has been taking allegra, sudafed, dayquil, nyquil, flonase, afrin OTC without much improvement.